# Patient Record
Sex: FEMALE | Race: WHITE | Employment: OTHER | URBAN - METROPOLITAN AREA
[De-identification: names, ages, dates, MRNs, and addresses within clinical notes are randomized per-mention and may not be internally consistent; named-entity substitution may affect disease eponyms.]

---

## 2017-05-10 ENCOUNTER — OFFICE VISIT (OUTPATIENT)
Dept: OBGYN CLINIC | Facility: CLINIC | Age: 57
End: 2017-05-10

## 2017-05-10 VITALS — WEIGHT: 186 LBS | BODY MASS INDEX: 29 KG/M2 | SYSTOLIC BLOOD PRESSURE: 118 MMHG | DIASTOLIC BLOOD PRESSURE: 64 MMHG

## 2017-05-10 DIAGNOSIS — I86.3 VARICOSE VEINS OF VULVA AND PERINEUM: Primary | ICD-10-CM

## 2017-05-10 PROCEDURE — 99203 OFFICE O/P NEW LOW 30 MIN: CPT | Performed by: OBSTETRICS & GYNECOLOGY

## 2017-05-10 RX ORDER — LEVOTHYROXINE SODIUM ANHYDROUS 100 UG/5ML
50 INJECTION, POWDER, LYOPHILIZED, FOR SOLUTION INTRAVENOUS DAILY
COMMUNITY
End: 2019-03-07

## 2017-05-10 RX ORDER — SIMVASTATIN 5 MG
5 TABLET ORAL NIGHTLY
COMMUNITY
End: 2019-07-24 | Stop reason: DRUGHIGH

## 2017-05-10 NOTE — PROGRESS NOTES
HPI:    Patient ID: Silvia Reese is a 61year old female. HPI  Patient reports skin tags on the vulva that she has had for 30 years. They bleed heavily when shaving. Discussed vulvar varicosities in detail and should be careful when shaving. This

## 2017-07-21 ENCOUNTER — LAB ENCOUNTER (OUTPATIENT)
Dept: LAB | Facility: HOSPITAL | Age: 57
End: 2017-07-21
Attending: INTERNAL MEDICINE
Payer: MEDICARE

## 2017-07-21 DIAGNOSIS — R73.09 ELEVATED GLUCOSE: ICD-10-CM

## 2017-07-21 DIAGNOSIS — E78.5 DYSLIPIDEMIA: ICD-10-CM

## 2017-07-21 DIAGNOSIS — E03.9 HYPOTHYROIDISM: ICD-10-CM

## 2017-07-21 DIAGNOSIS — E55.9 VITAMIN D DEFICIENCY: Primary | ICD-10-CM

## 2017-07-21 LAB
25(OH)D3 SERPL-MCNC: 35.4 NG/ML
ALBUMIN SERPL BCP-MCNC: 4.1 G/DL (ref 3.5–4.8)
ALBUMIN/GLOB SERPL: 1.2 {RATIO} (ref 1–2)
ALP SERPL-CCNC: 40 U/L (ref 32–100)
ALT SERPL-CCNC: 21 U/L (ref 14–54)
ANION GAP SERPL CALC-SCNC: 6 MMOL/L (ref 0–18)
AST SERPL-CCNC: 22 U/L (ref 15–41)
BASOPHILS # BLD: 0 K/UL (ref 0–0.2)
BASOPHILS NFR BLD: 1 %
BILIRUB SERPL-MCNC: 0.5 MG/DL (ref 0.3–1.2)
BILIRUB UR QL: NEGATIVE
BUN SERPL-MCNC: 11 MG/DL (ref 8–20)
BUN/CREAT SERPL: 13.4 (ref 10–20)
CALCIUM SERPL-MCNC: 9.9 MG/DL (ref 8.5–10.5)
CHLORIDE SERPL-SCNC: 103 MMOL/L (ref 95–110)
CHOLEST SERPL-MCNC: 180 MG/DL (ref 110–200)
CO2 SERPL-SCNC: 31 MMOL/L (ref 22–32)
COLOR UR: YELLOW
CREAT SERPL-MCNC: 0.82 MG/DL (ref 0.5–1.5)
EOSINOPHIL # BLD: 0 K/UL (ref 0–0.7)
EOSINOPHIL NFR BLD: 1 %
ERYTHROCYTE [DISTWIDTH] IN BLOOD BY AUTOMATED COUNT: 13 % (ref 11–15)
GLOBULIN PLAS-MCNC: 3.5 G/DL (ref 2.5–3.7)
GLUCOSE SERPL-MCNC: 106 MG/DL (ref 70–99)
GLUCOSE UR-MCNC: NEGATIVE MG/DL
HBA1C MFR BLD: 6.1 % (ref 4–6)
HCT VFR BLD AUTO: 40.7 % (ref 35–48)
HDLC SERPL-MCNC: 44 MG/DL
HGB BLD-MCNC: 13.8 G/DL (ref 12–16)
HGB UR QL STRIP.AUTO: NEGATIVE
KETONES UR-MCNC: NEGATIVE MG/DL
LDLC SERPL CALC-MCNC: 109 MG/DL (ref 0–99)
LEUKOCYTE ESTERASE UR QL STRIP.AUTO: NEGATIVE
LYMPHOCYTES # BLD: 1.3 K/UL (ref 1–4)
LYMPHOCYTES NFR BLD: 30 %
MCH RBC QN AUTO: 30.8 PG (ref 27–32)
MCHC RBC AUTO-ENTMCNC: 33.9 G/DL (ref 32–37)
MCV RBC AUTO: 90.8 FL (ref 80–100)
MONOCYTES # BLD: 0.4 K/UL (ref 0–1)
MONOCYTES NFR BLD: 9 %
NEUTROPHILS # BLD AUTO: 2.6 K/UL (ref 1.8–7.7)
NEUTROPHILS NFR BLD: 59 %
NITRITE UR QL STRIP.AUTO: NEGATIVE
NONHDLC SERPL-MCNC: 136 MG/DL
OSMOLALITY UR CALC.SUM OF ELEC: 290 MOSM/KG (ref 275–295)
PH UR: 6 [PH] (ref 5–8)
PLATELET # BLD AUTO: 202 K/UL (ref 140–400)
PMV BLD AUTO: 9.2 FL (ref 7.4–10.3)
POTASSIUM SERPL-SCNC: 4.7 MMOL/L (ref 3.3–5.1)
PROT SERPL-MCNC: 7.6 G/DL (ref 5.9–8.4)
PROT UR-MCNC: NEGATIVE MG/DL
RBC # BLD AUTO: 4.49 M/UL (ref 3.7–5.4)
SODIUM SERPL-SCNC: 140 MMOL/L (ref 136–144)
SP GR UR STRIP: 1.02 (ref 1–1.03)
TRIGL SERPL-MCNC: 135 MG/DL (ref 1–149)
TSH SERPL-ACNC: 3.8 UIU/ML (ref 0.45–5.33)
UROBILINOGEN UR STRIP-ACNC: <2
VIT C UR-MCNC: NEGATIVE MG/DL
WBC # BLD AUTO: 4.4 K/UL (ref 4–11)

## 2017-07-21 PROCEDURE — 81003 URINALYSIS AUTO W/O SCOPE: CPT

## 2017-07-21 PROCEDURE — 36415 COLL VENOUS BLD VENIPUNCTURE: CPT

## 2017-07-21 PROCEDURE — 84443 ASSAY THYROID STIM HORMONE: CPT

## 2017-07-21 PROCEDURE — 80053 COMPREHEN METABOLIC PANEL: CPT

## 2017-07-21 PROCEDURE — 83036 HEMOGLOBIN GLYCOSYLATED A1C: CPT

## 2017-07-21 PROCEDURE — 82306 VITAMIN D 25 HYDROXY: CPT

## 2017-07-21 PROCEDURE — 80061 LIPID PANEL: CPT

## 2017-07-21 PROCEDURE — 85025 COMPLETE CBC W/AUTO DIFF WBC: CPT

## 2017-07-28 ENCOUNTER — OFFICE VISIT (OUTPATIENT)
Dept: GASTROENTEROLOGY | Facility: CLINIC | Age: 57
End: 2017-07-28

## 2017-07-28 VITALS
SYSTOLIC BLOOD PRESSURE: 99 MMHG | WEIGHT: 178 LBS | BODY MASS INDEX: 27.94 KG/M2 | HEART RATE: 80 BPM | HEIGHT: 67 IN | DIASTOLIC BLOOD PRESSURE: 68 MMHG

## 2017-07-28 DIAGNOSIS — D49.0 IPMN (INTRADUCTAL PAPILLARY MUCINOUS NEOPLASM): ICD-10-CM

## 2017-07-28 DIAGNOSIS — Z12.11 SCREEN FOR COLON CANCER: Primary | ICD-10-CM

## 2017-07-28 DIAGNOSIS — K86.9 PANCREATIC LESION: ICD-10-CM

## 2017-07-28 PROCEDURE — G0463 HOSPITAL OUTPT CLINIC VISIT: HCPCS | Performed by: INTERNAL MEDICINE

## 2017-07-28 PROCEDURE — 99204 OFFICE O/P NEW MOD 45 MIN: CPT | Performed by: INTERNAL MEDICINE

## 2017-07-28 RX ORDER — OMEPRAZOLE 40 MG/1
40 CAPSULE, DELAYED RELEASE ORAL DAILY
Qty: 30 CAPSULE | Refills: 3 | Status: SHIPPED | OUTPATIENT
Start: 2017-07-28 | End: 2017-08-27

## 2017-07-28 RX ORDER — IBUPROFEN 600 MG/1
600 TABLET ORAL EVERY 6 HOURS PRN
COMMUNITY

## 2017-07-28 NOTE — H&P
[address] [facility] [address] Gastroenterology                                                                                                  Clinic History and Physical     Pa of prior     pancreatitis, tiny pseudocysts. For further evaluation, and assessment     of possible ductal communication, MRCP is recommended.   2. Suggestion of a subcentimeter lesion in the upper pole of the left     kidney which may represent an angiomyo dysuria or gross hematuria  INTEGUMENT/BREAST:  SKIN:  negative for jaundice   ALLERGIC/IMMUNOLOGIC:  negative for hay fever  ENDOCRINE:  negative for cold intolerance and heat intolerance  MUSCULOSKELETAL:  negative for joint effusion/severe erythema  BEH doses of NSAIDs. No anemia on labs. LFTs wnl. #Sidebranch IPMN - PD normal size (2015)  #Screening-AVG - due in 2020   #Epigastric pain  #NSAID use    Recommend:  1. MRI test of pancreas  2.  Start omeprazole 40mg a day  3. Reduce intake of ibuprofen

## 2017-08-02 ENCOUNTER — HOSPITAL ENCOUNTER (OUTPATIENT)
Dept: MRI IMAGING | Facility: HOSPITAL | Age: 57
Discharge: HOME | End: 2017-08-02
Attending: INTERNAL MEDICINE
Payer: MEDICARE

## 2017-08-02 ENCOUNTER — TELEPHONE (OUTPATIENT)
Dept: GASTROENTEROLOGY | Facility: CLINIC | Age: 57
End: 2017-08-02

## 2017-08-02 DIAGNOSIS — D49.0 IPMN (INTRADUCTAL PAPILLARY MUCINOUS NEOPLASM): ICD-10-CM

## 2017-08-02 DIAGNOSIS — K86.9 PANCREATIC LESION: ICD-10-CM

## 2017-08-02 LAB — CREAT BLD-MCNC: 0.7 MG/DL (ref 0.5–1.5)

## 2017-08-02 PROCEDURE — 82565 ASSAY OF CREATININE: CPT

## 2017-08-02 PROCEDURE — 74183 MRI ABD W/O CNTR FLWD CNTR: CPT | Performed by: INTERNAL MEDICINE

## 2017-08-02 PROCEDURE — A9575 INJ GADOTERATE MEGLUMI 0.1ML: HCPCS | Performed by: INTERNAL MEDICINE

## 2017-08-02 NOTE — TELEPHONE ENCOUNTER
Called pt/daughter re: MRI scan of pancreas and left detailed msg. The cystic IPMN lesion is stable, no change in size since 2015. Will plan to repeat MRCP in 1 year. If she is still having epigastric pain, she will require EGD.  I asked her to call back

## 2017-11-29 ENCOUNTER — OFFICE VISIT (OUTPATIENT)
Dept: OBGYN CLINIC | Facility: CLINIC | Age: 57
End: 2017-11-29

## 2017-11-29 VITALS
WEIGHT: 185.38 LBS | SYSTOLIC BLOOD PRESSURE: 107 MMHG | HEART RATE: 70 BPM | BODY MASS INDEX: 29 KG/M2 | DIASTOLIC BLOOD PRESSURE: 73 MMHG

## 2017-11-29 DIAGNOSIS — R39.15 URGENCY OF URINATION: ICD-10-CM

## 2017-11-29 DIAGNOSIS — Z01.419 ENCOUNTER FOR GYNECOLOGICAL EXAMINATION WITHOUT ABNORMAL FINDING: ICD-10-CM

## 2017-11-29 DIAGNOSIS — Z12.31 ENCOUNTER FOR SCREENING MAMMOGRAM FOR BREAST CANCER: ICD-10-CM

## 2017-11-29 DIAGNOSIS — Z01.419 ENCOUNTER FOR WELL WOMAN EXAM WITH ROUTINE GYNECOLOGICAL EXAM: Primary | ICD-10-CM

## 2017-11-29 PROCEDURE — 99396 PREV VISIT EST AGE 40-64: CPT | Performed by: OBSTETRICS & GYNECOLOGY

## 2017-11-29 RX ORDER — LEVOTHYROXINE SODIUM 0.07 MG/1
75 TABLET ORAL
COMMUNITY

## 2017-11-29 RX ORDER — METOCLOPRAMIDE HYDROCHLORIDE 5 MG/5ML
5 SOLUTION ORAL AS NEEDED
COMMUNITY
Start: 2015-02-11

## 2017-11-30 NOTE — PROGRESS NOTES
HPI:    Patient ID: Laurence Hitchcock is a 61year old female. HPI  Patient here for routine exam.  Reports urinary frequency but not a lot of urine comes out. Will check U/A but patient admits to not drinking water very much. Needs mammogram order. Skin: Skin is warm and dry. Psychiatric: She has a normal mood and affect. Her behavior is normal. Judgment and thought content normal.   Nursing note and vitals reviewed. Pap Done.            ASSESSMENT/PLAN:   Encounter for well woman exam with joana

## 2017-12-27 ENCOUNTER — OFFICE VISIT (OUTPATIENT)
Dept: DERMATOLOGY CLINIC | Facility: CLINIC | Age: 57
End: 2017-12-27

## 2017-12-27 DIAGNOSIS — L30.9 DERMATITIS: ICD-10-CM

## 2017-12-27 DIAGNOSIS — L82.1 SEBORRHEIC KERATOSES: ICD-10-CM

## 2017-12-27 DIAGNOSIS — D23.60 BENIGN NEOPLASM OF SKIN OF UPPER LIMB, INCLUDING SHOULDER, UNSPECIFIED LATERALITY: ICD-10-CM

## 2017-12-27 DIAGNOSIS — D23.4 BENIGN NEOPLASM OF SCALP AND SKIN OF NECK: ICD-10-CM

## 2017-12-27 DIAGNOSIS — D23.70 BENIGN NEOPLASM OF SKIN OF LOWER LIMB, INCLUDING HIP, UNSPECIFIED LATERALITY: ICD-10-CM

## 2017-12-27 DIAGNOSIS — D23.5 BENIGN NEOPLASM OF SKIN OF TRUNK, EXCEPT SCROTUM: Primary | ICD-10-CM

## 2017-12-27 PROCEDURE — 99202 OFFICE O/P NEW SF 15 MIN: CPT | Performed by: DERMATOLOGY

## 2017-12-27 PROCEDURE — G0463 HOSPITAL OUTPT CLINIC VISIT: HCPCS | Performed by: DERMATOLOGY

## 2018-01-08 NOTE — PROGRESS NOTES
Clifton Lane is a 61year old female. HPI:     CC:  Patient presents with:  Full Skin Exam: New pt presenting for full body skin exam. Pt denies personal and family hx of skin cancer. Dryness: Pt concerned with dryness to hands and face.   Derm Probl date: CHOLECYSTECTOMY  2015: COLONOSCOPY  2015: EGD  11/19/2012: ELECTROCARDIOGRAM, COMPLETE      Comment: scanned to media tab    Social History  Social History   Marital status:    Spouse name: N/A    Years of education: N/A  Number of children: N/ examined with dermoscopy benign-appearing patterns. Waxy tannish keratotic papules scattered, cherry-red vascular papules scattered. See map today's date for lesions noted . Otherwise remarkable for lesions as noted on map.   See details of exam various therapies, risks benefits discussed. Pathophysiology discussed with patient. Therapeutic options reviewed. See  Medications in grid. Instructions reviewed at length.     Benign nevi, seborrheic  keratoses, cherry angiomas:  Reassurance regarding o

## 2018-01-17 ENCOUNTER — HOSPITAL ENCOUNTER (OUTPATIENT)
Dept: MAMMOGRAPHY | Age: 58
Discharge: HOME | End: 2018-01-17
Attending: OBSTETRICS & GYNECOLOGY
Payer: MEDICARE

## 2018-01-17 DIAGNOSIS — Z12.31 ENCOUNTER FOR SCREENING MAMMOGRAM FOR BREAST CANCER: ICD-10-CM

## 2018-01-17 PROCEDURE — 77067 SCR MAMMO BI INCL CAD: CPT | Performed by: OBSTETRICS & GYNECOLOGY

## 2019-03-07 ENCOUNTER — APPOINTMENT (OUTPATIENT)
Dept: CT IMAGING | Age: 59
End: 2019-03-07
Attending: EMERGENCY MEDICINE
Payer: MEDICARE

## 2019-03-07 ENCOUNTER — APPOINTMENT (OUTPATIENT)
Dept: MRI IMAGING | Age: 59
End: 2019-03-07
Attending: EMERGENCY MEDICINE
Payer: MEDICARE

## 2019-03-07 ENCOUNTER — HOSPITAL ENCOUNTER (EMERGENCY)
Age: 59
Discharge: HOME | End: 2019-03-07
Attending: EMERGENCY MEDICINE
Payer: MEDICARE

## 2019-03-07 ENCOUNTER — APPOINTMENT (OUTPATIENT)
Dept: GENERAL RADIOLOGY | Age: 59
End: 2019-03-07
Attending: EMERGENCY MEDICINE
Payer: MEDICARE

## 2019-03-07 VITALS
BODY MASS INDEX: 28.25 KG/M2 | HEART RATE: 72 BPM | RESPIRATION RATE: 16 BRPM | TEMPERATURE: 99 F | SYSTOLIC BLOOD PRESSURE: 111 MMHG | OXYGEN SATURATION: 95 % | WEIGHT: 180 LBS | HEIGHT: 67 IN | DIASTOLIC BLOOD PRESSURE: 76 MMHG

## 2019-03-07 DIAGNOSIS — R20.2 NUMBNESS AND TINGLING OF BOTH LEGS: Primary | ICD-10-CM

## 2019-03-07 DIAGNOSIS — R20.0 NUMBNESS AND TINGLING OF BOTH LEGS: Primary | ICD-10-CM

## 2019-03-07 LAB
ALBUMIN SERPL-MCNC: 3.6 G/DL (ref 3.4–5)
ALBUMIN/GLOB SERPL: 1 {RATIO} (ref 1–2)
ALP LIVER SERPL-CCNC: 46 U/L (ref 46–118)
ALT SERPL-CCNC: 23 U/L (ref 13–56)
ANION GAP SERPL CALC-SCNC: 7 MMOL/L (ref 0–18)
AST SERPL-CCNC: 10 U/L (ref 15–37)
BASOPHILS # BLD AUTO: 0.04 X10(3) UL (ref 0–0.2)
BASOPHILS NFR BLD AUTO: 0.7 %
BILIRUB SERPL-MCNC: 0.3 MG/DL (ref 0.1–2)
BILIRUB UR QL STRIP.AUTO: NEGATIVE
BUN BLD-MCNC: 12 MG/DL (ref 7–18)
BUN/CREAT SERPL: 16.4 (ref 10–20)
CALCIUM BLD-MCNC: 8.3 MG/DL (ref 8.5–10.1)
CHLORIDE SERPL-SCNC: 107 MMOL/L (ref 98–107)
CK SERPL-CCNC: 70 U/L (ref 26–192)
CLARITY UR REFRACT.AUTO: CLEAR
CO2 SERPL-SCNC: 27 MMOL/L (ref 21–32)
COLOR UR AUTO: YELLOW
CREAT BLD-MCNC: 0.73 MG/DL (ref 0.55–1.02)
DEPRECATED RDW RBC AUTO: 39.8 FL (ref 35.1–46.3)
EOSINOPHIL # BLD AUTO: 0.05 X10(3) UL (ref 0–0.7)
EOSINOPHIL NFR BLD AUTO: 0.8 %
ERYTHROCYTE [DISTWIDTH] IN BLOOD BY AUTOMATED COUNT: 12 % (ref 11–15)
GLOBULIN PLAS-MCNC: 3.7 G/DL (ref 2.8–4.4)
GLUCOSE BLD-MCNC: 157 MG/DL (ref 70–99)
GLUCOSE UR STRIP.AUTO-MCNC: NEGATIVE MG/DL
HCT VFR BLD AUTO: 36.7 % (ref 35–48)
HGB BLD-MCNC: 12.1 G/DL (ref 12–16)
IMM GRANULOCYTES # BLD AUTO: 0.01 X10(3) UL (ref 0–1)
IMM GRANULOCYTES NFR BLD: 0.2 %
KETONES UR STRIP.AUTO-MCNC: NEGATIVE MG/DL
LEUKOCYTE ESTERASE UR QL STRIP.AUTO: NEGATIVE
LYMPHOCYTES # BLD AUTO: 1.71 X10(3) UL (ref 1–4)
LYMPHOCYTES NFR BLD AUTO: 29 %
M PROTEIN MFR SERPL ELPH: 7.3 G/DL (ref 6.4–8.2)
MCH RBC QN AUTO: 29.9 PG (ref 26–34)
MCHC RBC AUTO-ENTMCNC: 33 G/DL (ref 31–37)
MCV RBC AUTO: 90.6 FL (ref 80–100)
MONOCYTES # BLD AUTO: 0.5 X10(3) UL (ref 0.1–1)
MONOCYTES NFR BLD AUTO: 8.5 %
NEUTROPHILS # BLD AUTO: 3.59 X10 (3) UL (ref 1.5–7.7)
NEUTROPHILS # BLD AUTO: 3.59 X10(3) UL (ref 1.5–7.7)
NEUTROPHILS NFR BLD AUTO: 60.8 %
NITRITE UR QL STRIP.AUTO: NEGATIVE
OSMOLALITY SERPL CALC.SUM OF ELEC: 295 MOSM/KG (ref 275–295)
PH UR STRIP.AUTO: 5.5 [PH] (ref 4.5–8)
PLATELET # BLD AUTO: 213 10(3)UL (ref 150–450)
POTASSIUM SERPL-SCNC: 3.8 MMOL/L (ref 3.5–5.1)
PROT UR STRIP.AUTO-MCNC: NEGATIVE MG/DL
RBC # BLD AUTO: 4.05 X10(6)UL (ref 3.8–5.3)
SODIUM SERPL-SCNC: 141 MMOL/L (ref 136–145)
SP GR UR STRIP.AUTO: 1.02 (ref 1–1.03)
UROBILINOGEN UR STRIP.AUTO-MCNC: 0.2 MG/DL
WBC # BLD AUTO: 5.9 X10(3) UL (ref 4–11)

## 2019-03-07 PROCEDURE — 81003 URINALYSIS AUTO W/O SCOPE: CPT | Performed by: EMERGENCY MEDICINE

## 2019-03-07 PROCEDURE — 70553 MRI BRAIN STEM W/O & W/DYE: CPT | Performed by: EMERGENCY MEDICINE

## 2019-03-07 PROCEDURE — 80053 COMPREHEN METABOLIC PANEL: CPT | Performed by: EMERGENCY MEDICINE

## 2019-03-07 PROCEDURE — 36415 COLL VENOUS BLD VENIPUNCTURE: CPT

## 2019-03-07 PROCEDURE — 85025 COMPLETE CBC W/AUTO DIFF WBC: CPT | Performed by: EMERGENCY MEDICINE

## 2019-03-07 PROCEDURE — 99284 EMERGENCY DEPT VISIT MOD MDM: CPT

## 2019-03-07 PROCEDURE — 99285 EMERGENCY DEPT VISIT HI MDM: CPT

## 2019-03-07 PROCEDURE — 71046 X-RAY EXAM CHEST 2 VIEWS: CPT | Performed by: EMERGENCY MEDICINE

## 2019-03-07 PROCEDURE — A9575 INJ GADOTERATE MEGLUMI 0.1ML: HCPCS | Performed by: RADIOLOGY

## 2019-03-07 PROCEDURE — 70450 CT HEAD/BRAIN W/O DYE: CPT | Performed by: EMERGENCY MEDICINE

## 2019-03-07 PROCEDURE — 82550 ASSAY OF CK (CPK): CPT | Performed by: EMERGENCY MEDICINE

## 2019-03-07 NOTE — ED PROVIDER NOTES
Patient Seen in: [facility] [place]    History   Patient presents with:  Numbness Weakness (neurologic)  Headache (neurologic)    Stated Complaint: numbness/tingling to both legs since yesterday, headache.     HPI    50-year-old fem SpO2 94%   BMI 28.19 kg/m²         Physical Exam    General: Alert, oriented, no apparent distress  HEENT: Atraumatic, normocephalic. Pupils equal reactive. Extraocular motions intact. Oropharynx clear.     Neck: Supple  Lungs: Clear to auscultation raúl has been having numbness and tingling down both of her legs since yesterday. She also has a headache that started today. FINDINGS:  LUNGS:  No focal consolidation. Normal vascularity. CARDIAC:  Normal size cardiac silhouette.  MEDIASTINUM:  Normal. PLEU ruptured dermoid is considered, and MRI of the brain is recommended for further evaluation.     Dictated by: Nasim Samuels MD on 3/07/2019 at 14:12     Approved by: Nasim Samuels MD            Mri Brain (w+wo) (KUV=[id number])    Result Date: 3/7/2019  PROCEDURE dermoid. Dictated by: Jen Hernandez MD on 3/07/2019 at 15:32     Approved by: Jen Hernandez MD                MDM         Patient is completely neurovascularly intact to the lower extremities.   She describes a pins and needle sensation mostly t

## 2019-03-07 NOTE — ED INITIAL ASSESSMENT (HPI)
Numbness and tingling to both legs from hips down. States this started yesterday. Denies back pain, denies any trauma.

## 2019-07-03 ENCOUNTER — OFFICE VISIT (OUTPATIENT)
Dept: RHEUMATOLOGY | Facility: CLINIC | Age: 59
End: 2019-07-03
Payer: MEDICARE

## 2019-07-03 ENCOUNTER — HOSPITAL ENCOUNTER (OUTPATIENT)
Dept: GENERAL RADIOLOGY | Age: 59
Discharge: HOME | End: 2019-07-03
Attending: INTERNAL MEDICINE
Payer: MEDICARE

## 2019-07-03 ENCOUNTER — APPOINTMENT (OUTPATIENT)
Dept: LAB | Age: 59
End: 2019-07-03
Attending: INTERNAL MEDICINE
Payer: MEDICARE

## 2019-07-03 ENCOUNTER — HOSPITAL ENCOUNTER (OUTPATIENT)
Dept: GENERAL RADIOLOGY | Age: 59
Discharge: HOME | End: 2019-07-03
Attending: INTERNAL MEDICINE | Admitting: INTERNAL MEDICINE
Payer: MEDICARE

## 2019-07-03 VITALS
SYSTOLIC BLOOD PRESSURE: 117 MMHG | HEART RATE: 79 BPM | BODY MASS INDEX: 27 KG/M2 | DIASTOLIC BLOOD PRESSURE: 79 MMHG | WEIGHT: 175 LBS

## 2019-07-03 DIAGNOSIS — M13.0 POLYARTHRITIS: Primary | ICD-10-CM

## 2019-07-03 DIAGNOSIS — M13.0 POLYARTHRITIS: ICD-10-CM

## 2019-07-03 DIAGNOSIS — R76.8 POSITIVE ANA (ANTINUCLEAR ANTIBODY): ICD-10-CM

## 2019-07-03 LAB
C3 SERPL-MCNC: 121 MG/DL (ref 90–180)
C4 SERPL-MCNC: 24.1 MG/DL (ref 10–40)
RHEUMATOID FACT SERPL-ACNC: <10 IU/ML (ref ?–15)

## 2019-07-03 PROCEDURE — 86235 NUCLEAR ANTIGEN ANTIBODY: CPT

## 2019-07-03 PROCEDURE — 72110 X-RAY EXAM L-2 SPINE 4/>VWS: CPT | Performed by: INTERNAL MEDICINE

## 2019-07-03 PROCEDURE — 86200 CCP ANTIBODY: CPT

## 2019-07-03 PROCEDURE — 86225 DNA ANTIBODY NATIVE: CPT

## 2019-07-03 PROCEDURE — 73560 X-RAY EXAM OF KNEE 1 OR 2: CPT | Performed by: INTERNAL MEDICINE

## 2019-07-03 PROCEDURE — G0463 HOSPITAL OUTPT CLINIC VISIT: HCPCS | Performed by: INTERNAL MEDICINE

## 2019-07-03 PROCEDURE — 86431 RHEUMATOID FACTOR QUANT: CPT

## 2019-07-03 PROCEDURE — 99204 OFFICE O/P NEW MOD 45 MIN: CPT | Performed by: INTERNAL MEDICINE

## 2019-07-03 PROCEDURE — 36415 COLL VENOUS BLD VENIPUNCTURE: CPT

## 2019-07-03 PROCEDURE — 84165 PROTEIN E-PHORESIS SERUM: CPT

## 2019-07-03 PROCEDURE — 86160 COMPLEMENT ANTIGEN: CPT

## 2019-07-03 NOTE — PROGRESS NOTES
Dear Dr. Danette Beltran:    I saw your patient Mariah Nice in consultation this afternoon at your request for evaluation of positive NEW and arthritis.   As you know, she is a 56-year-old woman from [facility], who complains primarily of discomfort in her knee systems:  No fevers, chills, sweats, anorexia. She is lost 10 pounds in the last month may be because she is been walking more. No rash, alopecia, eye inflammation, oral or nasal ulcers, lymphadenopathy. No shortness of breath or chest pain.   Occasional weeks.    3.  Hypothyroidism. 4.  Severely low vitamin D. She is on high-dose replacement. Thank you for inviting me to participate in her care. Sincerely,      Dmitriy Martinez MD   Rheumatology.

## 2019-07-05 ENCOUNTER — TELEPHONE (OUTPATIENT)
Dept: INTERNAL MEDICINE CLINIC | Facility: CLINIC | Age: 59
End: 2019-07-05

## 2019-07-05 LAB
CCP IGG SERPL-ACNC: 1 U/ML (ref 0–6.9)
DSDNA AB TITR SER: <10 {TITER}

## 2019-07-05 NOTE — TELEPHONE ENCOUNTER
Pt's daughter is calling to schedule 3 week f/u at [facility] but needs an appt at 4 or 410. She states she spoke to DR about this at last OV. Ok to add pt in at that time? Please advise. Thank you.      Please reply to kelly: MICHELLE Javier [facility]

## 2019-07-06 LAB
ALBUMIN SERPL ELPH-MCNC: 4.34 G/DL (ref 3.75–5.21)
ALBUMIN/GLOB SERPL: 1.29 {RATIO} (ref 1–2)
ALPHA1 GLOB SERPL ELPH-MCNC: 0.26 G/DL (ref 0.19–0.46)
ALPHA2 GLOB SERPL ELPH-MCNC: 0.57 G/DL (ref 0.48–1.05)
B-GLOBULIN SERPL ELPH-MCNC: 0.82 G/DL (ref 0.68–1.23)
GAMMA GLOB SERPL ELPH-MCNC: 1.7 G/DL (ref 0.62–1.7)
TOTAL PROTEIN (SPECIAL TESTING): 7.7 G/DL (ref 6.5–9.1)

## 2019-07-09 LAB
ENA SM IGG SER QL: NEGATIVE
ENA SM+RNP AB SER QL: NEGATIVE
ENA SS-A AB SER QL IA: NEGATIVE
ENA SS-B AB SER QL IA: NEGATIVE

## 2019-07-24 ENCOUNTER — OFFICE VISIT (OUTPATIENT)
Dept: RHEUMATOLOGY | Facility: CLINIC | Age: 59
End: 2019-07-24

## 2019-07-24 VITALS
DIASTOLIC BLOOD PRESSURE: 62 MMHG | HEIGHT: 67 IN | SYSTOLIC BLOOD PRESSURE: 96 MMHG | HEART RATE: 80 BPM | WEIGHT: 176.81 LBS | BODY MASS INDEX: 27.75 KG/M2

## 2019-07-24 DIAGNOSIS — M15.9 PRIMARY OSTEOARTHRITIS INVOLVING MULTIPLE JOINTS: Primary | ICD-10-CM

## 2019-07-24 PROBLEM — M15.0 PRIMARY OSTEOARTHRITIS INVOLVING MULTIPLE JOINTS: Status: ACTIVE | Noted: 2019-07-24

## 2019-07-24 PROCEDURE — 99213 OFFICE O/P EST LOW 20 MIN: CPT | Performed by: INTERNAL MEDICINE

## 2019-07-24 PROCEDURE — G0463 HOSPITAL OUTPT CLINIC VISIT: HCPCS | Performed by: INTERNAL MEDICINE

## 2019-07-24 RX ORDER — SIMVASTATIN 20 MG
20 TABLET ORAL NIGHTLY
Refills: 1 | COMMUNITY
Start: 2019-05-11

## 2019-07-24 RX ORDER — TRAZODONE HYDROCHLORIDE 50 MG/1
50 TABLET ORAL AS NEEDED
Refills: 1 | COMMUNITY
Start: 2019-05-11

## 2019-07-24 RX ORDER — IBUPROFEN 200 MG
200 TABLET ORAL AS NEEDED
COMMUNITY

## 2019-07-24 RX ORDER — NAPROXEN 500 MG/1
500 TABLET ORAL AS NEEDED
Refills: 1 | COMMUNITY
Start: 2019-05-11

## 2019-07-24 RX ORDER — CLONAZEPAM 0.5 MG/1
0.5 TABLET ORAL AS NEEDED
Refills: 0 | COMMUNITY
Start: 2019-02-07

## 2019-07-24 NOTE — PROGRESS NOTES
Dear Dr. Ge Mcknight:     I saw your patient Clifton Lane in follow-up this afternoon in my rheumatology clinic. She continues to have pain in her joints, like before. We reviewed her lab results from July 3rd of 2019.   SPEP was normal.  Specific an

## 2020-05-01 ENCOUNTER — TELEPHONE (OUTPATIENT)
Dept: RHEUMATOLOGY | Facility: CLINIC | Age: 60
End: 2020-05-01

## 2020-05-08 ENCOUNTER — TELEPHONE (OUTPATIENT)
Dept: RHEUMATOLOGY | Facility: CLINIC | Age: 60
End: 2020-05-08

## 2021-12-08 ENCOUNTER — LAB ENCOUNTER (OUTPATIENT)
Dept: LAB | Age: 61
End: 2021-12-08
Attending: INTERNAL MEDICINE
Payer: MEDICARE

## 2021-12-08 DIAGNOSIS — E55.9 VITAMIN D DEFICIENCY: ICD-10-CM

## 2021-12-08 DIAGNOSIS — E16.2 HYPOGLYCEMIA: ICD-10-CM

## 2021-12-08 DIAGNOSIS — R42 DIZZINESS: Primary | ICD-10-CM

## 2021-12-08 PROCEDURE — 36415 COLL VENOUS BLD VENIPUNCTURE: CPT

## 2021-12-08 PROCEDURE — 80053 COMPREHEN METABOLIC PANEL: CPT

## 2021-12-08 PROCEDURE — 83036 HEMOGLOBIN GLYCOSYLATED A1C: CPT

## 2021-12-08 PROCEDURE — 82306 VITAMIN D 25 HYDROXY: CPT

## 2021-12-08 PROCEDURE — 85025 COMPLETE CBC W/AUTO DIFF WBC: CPT

## 2021-12-08 PROCEDURE — 84443 ASSAY THYROID STIM HORMONE: CPT

## 2021-12-08 PROCEDURE — 81001 URINALYSIS AUTO W/SCOPE: CPT

## 2021-12-08 PROCEDURE — 80061 LIPID PANEL: CPT

## 2022-02-09 PROBLEM — M17.12 PRIMARY OSTEOARTHRITIS OF LEFT KNEE: Status: ACTIVE | Noted: 2022-02-09

## 2022-03-10 ENCOUNTER — HOSPITAL ENCOUNTER (OUTPATIENT)
Dept: ULTRASOUND IMAGING | Age: 62
Discharge: HOME | End: 2022-03-10
Attending: OTOLARYNGOLOGY
Payer: MEDICARE

## 2022-03-10 DIAGNOSIS — E05.90 HYPERTHYROIDISM: ICD-10-CM

## 2022-03-10 PROCEDURE — 76536 US EXAM OF HEAD AND NECK: CPT | Performed by: OTOLARYNGOLOGY

## 2022-05-04 PROBLEM — K86.2 CYSTIC MASS OF PANCREAS (HCC): Status: ACTIVE | Noted: 2017-07-28

## 2022-05-04 PROBLEM — K86.2 CYSTIC MASS OF PANCREAS: Status: ACTIVE | Noted: 2017-07-28

## 2022-05-05 ENCOUNTER — OFFICE VISIT (OUTPATIENT)
Dept: OBGYN CLINIC | Facility: CLINIC | Age: 62
End: 2022-05-05
Payer: MEDICARE

## 2022-05-05 VITALS
HEART RATE: 90 BPM | WEIGHT: 167.38 LBS | SYSTOLIC BLOOD PRESSURE: 122 MMHG | DIASTOLIC BLOOD PRESSURE: 74 MMHG | BODY MASS INDEX: 26.27 KG/M2 | HEIGHT: 67 IN

## 2022-05-05 DIAGNOSIS — Z01.419 ENCOUNTER FOR GYNECOLOGICAL EXAMINATION WITHOUT ABNORMAL FINDING: Primary | ICD-10-CM

## 2022-05-05 DIAGNOSIS — Z12.4 SCREENING FOR CERVICAL CANCER: ICD-10-CM

## 2022-05-05 PROCEDURE — 87624 HPV HI-RISK TYP POOLED RSLT: CPT | Performed by: OBSTETRICS & GYNECOLOGY

## 2022-05-05 PROCEDURE — 88175 CYTOPATH C/V AUTO FLUID REDO: CPT | Performed by: OBSTETRICS & GYNECOLOGY

## 2022-05-05 PROCEDURE — G0101 CA SCREEN;PELVIC/BREAST EXAM: HCPCS | Performed by: OBSTETRICS & GYNECOLOGY

## 2022-05-05 RX ORDER — CEPHALEXIN 500 MG/1
500 CAPSULE ORAL 3 TIMES DAILY
COMMUNITY
Start: 2021-12-13

## 2022-05-05 RX ORDER — OMEPRAZOLE 20 MG/1
20 CAPSULE, DELAYED RELEASE ORAL DAILY
COMMUNITY
Start: 2022-04-25

## 2022-05-06 LAB — HPV I/H RISK 1 DNA SPEC QL NAA+PROBE: NEGATIVE

## 2022-11-09 ENCOUNTER — LAB ENCOUNTER (OUTPATIENT)
Dept: LAB | Age: 62
End: 2022-11-09
Attending: INTERNAL MEDICINE
Payer: MEDICARE

## 2022-11-09 DIAGNOSIS — E03.9 HYPOTHYROIDISM: ICD-10-CM

## 2022-11-09 DIAGNOSIS — E78.5 DYSLIPIDEMIA: Primary | ICD-10-CM

## 2022-11-09 DIAGNOSIS — E55.9 VITAMIN D DEFICIENCY: ICD-10-CM

## 2022-11-09 DIAGNOSIS — N39.0 UTI (URINARY TRACT INFECTION): ICD-10-CM

## 2022-11-09 DIAGNOSIS — E66.9 OBESITY: ICD-10-CM

## 2022-11-09 LAB
ALBUMIN SERPL-MCNC: 4 G/DL (ref 3.4–5)
ALBUMIN/GLOB SERPL: 1.1 {RATIO} (ref 1–2)
ALP LIVER SERPL-CCNC: 48 U/L
ALT SERPL-CCNC: 29 U/L
ANION GAP SERPL CALC-SCNC: 4 MMOL/L (ref 0–18)
AST SERPL-CCNC: 18 U/L (ref 15–37)
BASOPHILS # BLD AUTO: 0.05 X10(3) UL (ref 0–0.2)
BASOPHILS NFR BLD AUTO: 0.8 %
BILIRUB SERPL-MCNC: 0.5 MG/DL (ref 0.1–2)
BILIRUB UR QL STRIP.AUTO: NEGATIVE
BUN BLD-MCNC: 13 MG/DL (ref 7–18)
CALCIUM BLD-MCNC: 9.3 MG/DL (ref 8.5–10.1)
CHLORIDE SERPL-SCNC: 107 MMOL/L (ref 98–112)
CHOLEST SERPL-MCNC: 182 MG/DL (ref ?–200)
CLARITY UR REFRACT.AUTO: CLEAR
CO2 SERPL-SCNC: 30 MMOL/L (ref 21–32)
COLOR UR AUTO: YELLOW
CREAT BLD-MCNC: 0.86 MG/DL
EOSINOPHIL # BLD AUTO: 0.05 X10(3) UL (ref 0–0.7)
EOSINOPHIL NFR BLD AUTO: 0.8 %
ERYTHROCYTE [DISTWIDTH] IN BLOOD BY AUTOMATED COUNT: 12.4 %
FASTING PATIENT LIPID ANSWER: YES
FASTING STATUS PATIENT QL REPORTED: YES
GFR SERPLBLD BASED ON 1.73 SQ M-ARVRAT: 76 ML/MIN/1.73M2 (ref 60–?)
GLOBULIN PLAS-MCNC: 3.6 G/DL (ref 2.8–4.4)
GLUCOSE BLD-MCNC: 99 MG/DL (ref 70–99)
GLUCOSE UR STRIP.AUTO-MCNC: NEGATIVE MG/DL
HCT VFR BLD AUTO: 40.1 %
HDLC SERPL-MCNC: 50 MG/DL (ref 40–59)
HGB BLD-MCNC: 13.3 G/DL
HYALINE CASTS #/AREA URNS AUTO: PRESENT /LPF
IMM GRANULOCYTES # BLD AUTO: 0.02 X10(3) UL (ref 0–1)
IMM GRANULOCYTES NFR BLD: 0.3 %
KETONES UR STRIP.AUTO-MCNC: NEGATIVE MG/DL
LDLC SERPL CALC-MCNC: 116 MG/DL (ref ?–100)
LEUKOCYTE ESTERASE UR QL STRIP.AUTO: NEGATIVE
LYMPHOCYTES # BLD AUTO: 1.95 X10(3) UL (ref 1–4)
LYMPHOCYTES NFR BLD AUTO: 31.9 %
MCH RBC QN AUTO: 31.3 PG (ref 26–34)
MCHC RBC AUTO-ENTMCNC: 33.2 G/DL (ref 31–37)
MCV RBC AUTO: 94.4 FL
MONOCYTES # BLD AUTO: 0.59 X10(3) UL (ref 0.1–1)
MONOCYTES NFR BLD AUTO: 9.6 %
NEUTROPHILS # BLD AUTO: 3.46 X10 (3) UL (ref 1.5–7.7)
NEUTROPHILS # BLD AUTO: 3.46 X10(3) UL (ref 1.5–7.7)
NEUTROPHILS NFR BLD AUTO: 56.6 %
NITRITE UR QL STRIP.AUTO: NEGATIVE
NONHDLC SERPL-MCNC: 132 MG/DL (ref ?–130)
OSMOLALITY SERPL CALC.SUM OF ELEC: 292 MOSM/KG (ref 275–295)
PH UR STRIP.AUTO: 6 [PH] (ref 5–8)
PLATELET # BLD AUTO: 263 10(3)UL (ref 150–450)
POTASSIUM SERPL-SCNC: 4.4 MMOL/L (ref 3.5–5.1)
PROT SERPL-MCNC: 7.6 G/DL (ref 6.4–8.2)
PROT UR STRIP.AUTO-MCNC: NEGATIVE MG/DL
RBC # BLD AUTO: 4.25 X10(6)UL
SODIUM SERPL-SCNC: 141 MMOL/L (ref 136–145)
SP GR UR STRIP.AUTO: 1.01 (ref 1–1.03)
TRIGL SERPL-MCNC: 89 MG/DL (ref 30–149)
TSI SER-ACNC: 3.27 MIU/ML (ref 0.36–3.74)
UROBILINOGEN UR STRIP.AUTO-MCNC: <2 MG/DL
VIT D+METAB SERPL-MCNC: 27.6 NG/ML (ref 30–100)
VLDLC SERPL CALC-MCNC: 15 MG/DL (ref 0–30)
WBC # BLD AUTO: 6.1 X10(3) UL (ref 4–11)

## 2022-11-09 PROCEDURE — 81001 URINALYSIS AUTO W/SCOPE: CPT

## 2022-11-09 PROCEDURE — 80053 COMPREHEN METABOLIC PANEL: CPT

## 2022-11-09 PROCEDURE — 80061 LIPID PANEL: CPT

## 2022-11-09 PROCEDURE — 87086 URINE CULTURE/COLONY COUNT: CPT

## 2022-11-09 PROCEDURE — 85025 COMPLETE CBC W/AUTO DIFF WBC: CPT

## 2022-11-09 PROCEDURE — 84443 ASSAY THYROID STIM HORMONE: CPT

## 2022-11-09 PROCEDURE — 36415 COLL VENOUS BLD VENIPUNCTURE: CPT

## 2022-11-09 PROCEDURE — 82306 VITAMIN D 25 HYDROXY: CPT

## 2023-01-03 ENCOUNTER — OFFICE VISIT (OUTPATIENT)
Facility: LOCATION | Age: 63
End: 2023-01-03
Payer: MEDICARE

## 2023-01-03 DIAGNOSIS — R49.0 HOARSENESS: ICD-10-CM

## 2023-01-03 DIAGNOSIS — K21.9 LARYNGOPHARYNGEAL REFLUX: Primary | ICD-10-CM

## 2023-01-03 PROCEDURE — 99213 OFFICE O/P EST LOW 20 MIN: CPT | Performed by: OTOLARYNGOLOGY

## 2023-01-03 PROCEDURE — 31575 DIAGNOSTIC LARYNGOSCOPY: CPT | Performed by: OTOLARYNGOLOGY

## 2023-01-03 RX ORDER — OMEPRAZOLE 40 MG/1
CAPSULE, DELAYED RELEASE ORAL
Qty: 90 CAPSULE | Refills: 3 | Status: SHIPPED | OUTPATIENT
Start: 2023-01-03 | End: 2023-01-06

## 2023-01-06 RX ORDER — OMEPRAZOLE 40 MG/1
CAPSULE, DELAYED RELEASE ORAL
Qty: 90 CAPSULE | Refills: 3 | Status: SHIPPED | OUTPATIENT
Start: 2023-01-06

## 2023-01-09 ENCOUNTER — LAB ENCOUNTER (OUTPATIENT)
Dept: LAB | Age: 63
End: 2023-01-09
Attending: INTERNAL MEDICINE
Payer: MEDICARE

## 2023-01-09 DIAGNOSIS — R10.9 ABDOMINAL PAIN: Primary | ICD-10-CM

## 2023-01-09 DIAGNOSIS — K21.9 GERD (GASTROESOPHAGEAL REFLUX DISEASE): ICD-10-CM

## 2023-01-09 PROCEDURE — 83013 H PYLORI (C-13) BREATH: CPT

## 2023-01-11 LAB — H. PYLORI BREATH TEST: NEGATIVE

## 2023-02-07 ENCOUNTER — OFFICE VISIT (OUTPATIENT)
Facility: LOCATION | Age: 63
End: 2023-02-07
Payer: MEDICARE

## 2023-02-07 DIAGNOSIS — K21.9 LARYNGOPHARYNGEAL REFLUX: Primary | ICD-10-CM

## 2023-02-07 DIAGNOSIS — J04.0 ACUTE LARYNGITIS: ICD-10-CM

## 2023-02-07 PROCEDURE — 99213 OFFICE O/P EST LOW 20 MIN: CPT | Performed by: OTOLARYNGOLOGY

## 2023-02-07 PROCEDURE — 31575 DIAGNOSTIC LARYNGOSCOPY: CPT | Performed by: OTOLARYNGOLOGY

## 2023-02-07 RX ORDER — AMOXICILLIN AND CLAVULANATE POTASSIUM 875; 125 MG/1; MG/1
1 TABLET, FILM COATED ORAL EVERY 12 HOURS
Qty: 20 TABLET | Refills: 0 | Status: SHIPPED | OUTPATIENT
Start: 2023-02-07

## 2023-04-14 ENCOUNTER — LAB ENCOUNTER (OUTPATIENT)
Dept: LAB | Age: 63
End: 2023-04-14
Attending: INTERNAL MEDICINE
Payer: MEDICARE

## 2023-04-14 ENCOUNTER — HOSPITAL ENCOUNTER (OUTPATIENT)
Dept: CT IMAGING | Age: 63
Discharge: HOME | End: 2023-04-14
Attending: INTERNAL MEDICINE
Payer: MEDICARE

## 2023-04-14 DIAGNOSIS — N39.0 UTI (URINARY TRACT INFECTION): Primary | ICD-10-CM

## 2023-04-14 DIAGNOSIS — R63.4 WEIGHT LOSS: ICD-10-CM

## 2023-04-14 LAB
BILIRUB UR QL STRIP.AUTO: NEGATIVE
CLARITY UR REFRACT.AUTO: CLEAR
COLOR UR AUTO: YELLOW
GLUCOSE UR STRIP.AUTO-MCNC: NEGATIVE MG/DL
KETONES UR STRIP.AUTO-MCNC: NEGATIVE MG/DL
LEUKOCYTE ESTERASE UR QL STRIP.AUTO: NEGATIVE
NITRITE UR QL STRIP.AUTO: NEGATIVE
PH UR STRIP.AUTO: 6 [PH] (ref 5–8)
PROT UR STRIP.AUTO-MCNC: NEGATIVE MG/DL
SP GR UR STRIP.AUTO: 1.01 (ref 1–1.03)
UROBILINOGEN UR STRIP.AUTO-MCNC: <2 MG/DL

## 2023-04-14 PROCEDURE — 87086 URINE CULTURE/COLONY COUNT: CPT

## 2023-04-14 PROCEDURE — 81001 URINALYSIS AUTO W/SCOPE: CPT

## 2023-04-14 PROCEDURE — 87077 CULTURE AEROBIC IDENTIFY: CPT

## 2023-04-14 PROCEDURE — 87186 SC STD MICRODIL/AGAR DIL: CPT

## 2023-04-14 PROCEDURE — 71250 CT THORAX DX C-: CPT | Performed by: INTERNAL MEDICINE

## 2023-04-17 ENCOUNTER — OFFICE VISIT (OUTPATIENT)
Dept: NEUROLOGY | Facility: CLINIC | Age: 63
End: 2023-04-17
Payer: MEDICARE

## 2023-04-17 VITALS
SYSTOLIC BLOOD PRESSURE: 134 MMHG | HEIGHT: 67 IN | HEART RATE: 80 BPM | BODY MASS INDEX: 26.21 KG/M2 | RESPIRATION RATE: 16 BRPM | WEIGHT: 167 LBS | DIASTOLIC BLOOD PRESSURE: 78 MMHG

## 2023-04-17 DIAGNOSIS — M54.2 NECK PAIN: Primary | ICD-10-CM

## 2023-04-17 DIAGNOSIS — G95.0 SYRINX OF SPINAL CORD (HCC): ICD-10-CM

## 2023-04-17 DIAGNOSIS — M54.12 CERVICAL RADICULAR PAIN: ICD-10-CM

## 2023-04-17 PROCEDURE — 99204 OFFICE O/P NEW MOD 45 MIN: CPT | Performed by: OTHER

## 2023-04-17 RX ORDER — IBUPROFEN 200 MG
200 TABLET ORAL EVERY 6 HOURS PRN
COMMUNITY

## 2023-09-14 ENCOUNTER — LAB ENCOUNTER (OUTPATIENT)
Dept: LAB | Age: 63
End: 2023-09-14
Attending: INTERNAL MEDICINE
Payer: MEDICARE

## 2023-09-14 DIAGNOSIS — N39.0 UTI (URINARY TRACT INFECTION): ICD-10-CM

## 2023-09-14 DIAGNOSIS — E03.9 HYPOTHYROIDISM (ACQUIRED): Primary | ICD-10-CM

## 2023-09-14 LAB
ALBUMIN SERPL-MCNC: 3.8 G/DL (ref 3.4–5)
ALBUMIN/GLOB SERPL: 0.9 {RATIO} (ref 1–2)
ALP LIVER SERPL-CCNC: 52 U/L
ALT SERPL-CCNC: 22 U/L
ANION GAP SERPL CALC-SCNC: 2 MMOL/L (ref 0–18)
AST SERPL-CCNC: 14 U/L (ref 15–37)
BASOPHILS # BLD AUTO: 0.03 X10(3) UL (ref 0–0.2)
BASOPHILS NFR BLD AUTO: 0.7 %
BILIRUB SERPL-MCNC: 0.5 MG/DL (ref 0.1–2)
BILIRUB UR QL STRIP.AUTO: NEGATIVE
BUN BLD-MCNC: 10 MG/DL (ref 7–18)
CALCIUM BLD-MCNC: 9.3 MG/DL (ref 8.5–10.1)
CHLORIDE SERPL-SCNC: 107 MMOL/L (ref 98–112)
CHOLEST SERPL-MCNC: 179 MG/DL (ref ?–200)
CLARITY UR REFRACT.AUTO: CLEAR
CO2 SERPL-SCNC: 30 MMOL/L (ref 21–32)
CREAT BLD-MCNC: 0.92 MG/DL
EGFRCR SERPLBLD CKD-EPI 2021: 70 ML/MIN/1.73M2 (ref 60–?)
EOSINOPHIL # BLD AUTO: 0.04 X10(3) UL (ref 0–0.7)
EOSINOPHIL NFR BLD AUTO: 1 %
ERYTHROCYTE [DISTWIDTH] IN BLOOD BY AUTOMATED COUNT: 12.2 %
FASTING PATIENT LIPID ANSWER: YES
FASTING STATUS PATIENT QL REPORTED: YES
GLOBULIN PLAS-MCNC: 4.3 G/DL (ref 2.8–4.4)
GLUCOSE BLD-MCNC: 107 MG/DL (ref 70–99)
GLUCOSE UR STRIP.AUTO-MCNC: NORMAL MG/DL
HCT VFR BLD AUTO: 40.2 %
HDLC SERPL-MCNC: 57 MG/DL (ref 40–59)
HGB BLD-MCNC: 13.1 G/DL
IMM GRANULOCYTES # BLD AUTO: 0.01 X10(3) UL (ref 0–1)
IMM GRANULOCYTES NFR BLD: 0.2 %
KETONES UR STRIP.AUTO-MCNC: NEGATIVE MG/DL
LDLC SERPL CALC-MCNC: 107 MG/DL (ref ?–100)
LEUKOCYTE ESTERASE UR QL STRIP.AUTO: NEGATIVE
LYMPHOCYTES # BLD AUTO: 1.51 X10(3) UL (ref 1–4)
LYMPHOCYTES NFR BLD AUTO: 36.3 %
MCH RBC QN AUTO: 30.1 PG (ref 26–34)
MCHC RBC AUTO-ENTMCNC: 32.6 G/DL (ref 31–37)
MCV RBC AUTO: 92.4 FL
MONOCYTES # BLD AUTO: 0.33 X10(3) UL (ref 0.1–1)
MONOCYTES NFR BLD AUTO: 7.9 %
NEUTROPHILS # BLD AUTO: 2.24 X10 (3) UL (ref 1.5–7.7)
NEUTROPHILS # BLD AUTO: 2.24 X10(3) UL (ref 1.5–7.7)
NEUTROPHILS NFR BLD AUTO: 53.9 %
NITRITE UR QL STRIP.AUTO: NEGATIVE
NONHDLC SERPL-MCNC: 122 MG/DL (ref ?–130)
OSMOLALITY SERPL CALC.SUM OF ELEC: 288 MOSM/KG (ref 275–295)
PH UR STRIP.AUTO: 6.5 [PH] (ref 5–8)
PLATELET # BLD AUTO: 250 10(3)UL (ref 150–450)
POTASSIUM SERPL-SCNC: 4.4 MMOL/L (ref 3.5–5.1)
PROT SERPL-MCNC: 8.1 G/DL (ref 6.4–8.2)
PROT UR STRIP.AUTO-MCNC: NEGATIVE MG/DL
RBC # BLD AUTO: 4.35 X10(6)UL
RBC UR QL AUTO: NEGATIVE
SODIUM SERPL-SCNC: 139 MMOL/L (ref 136–145)
SP GR UR STRIP.AUTO: 1.01 (ref 1–1.03)
TRIGL SERPL-MCNC: 83 MG/DL (ref 30–149)
TSI SER-ACNC: 4.37 MIU/ML (ref 0.36–3.74)
UROBILINOGEN UR STRIP.AUTO-MCNC: NORMAL MG/DL
VIT D+METAB SERPL-MCNC: 56.6 NG/ML (ref 30–100)
VLDLC SERPL CALC-MCNC: 14 MG/DL (ref 0–30)
WBC # BLD AUTO: 4.2 X10(3) UL (ref 4–11)

## 2023-09-14 PROCEDURE — 80053 COMPREHEN METABOLIC PANEL: CPT

## 2023-09-14 PROCEDURE — 84443 ASSAY THYROID STIM HORMONE: CPT

## 2023-09-14 PROCEDURE — 82306 VITAMIN D 25 HYDROXY: CPT

## 2023-09-14 PROCEDURE — 36415 COLL VENOUS BLD VENIPUNCTURE: CPT

## 2023-09-14 PROCEDURE — 85025 COMPLETE CBC W/AUTO DIFF WBC: CPT

## 2023-09-14 PROCEDURE — 80061 LIPID PANEL: CPT

## 2023-09-14 PROCEDURE — 81003 URINALYSIS AUTO W/O SCOPE: CPT

## 2023-11-27 ENCOUNTER — HOSPITAL ENCOUNTER (OUTPATIENT)
Dept: GENERAL RADIOLOGY | Age: 63
Discharge: HOME | End: 2023-11-27
Attending: INTERNAL MEDICINE
Payer: MEDICARE

## 2023-11-27 DIAGNOSIS — M79.89 SWELLING OF CLAVICULAR REGION: ICD-10-CM

## 2023-11-27 PROCEDURE — 73000 X-RAY EXAM OF COLLAR BONE: CPT | Performed by: INTERNAL MEDICINE

## 2023-11-27 PROCEDURE — 73030 X-RAY EXAM OF SHOULDER: CPT | Performed by: INTERNAL MEDICINE

## 2023-11-28 ENCOUNTER — LAB ENCOUNTER (OUTPATIENT)
Dept: LAB | Age: 63
End: 2023-11-28
Attending: INTERNAL MEDICINE
Payer: MEDICARE

## 2023-11-28 DIAGNOSIS — Z12.11 COLON CANCER SCREENING: Primary | ICD-10-CM

## 2023-11-28 DIAGNOSIS — K21.9 GERD (GASTROESOPHAGEAL REFLUX DISEASE): ICD-10-CM

## 2023-11-28 PROCEDURE — 87338 HPYLORI STOOL AG IA: CPT

## 2023-11-28 PROCEDURE — 82272 OCCULT BLD FECES 1-3 TESTS: CPT

## 2023-11-30 LAB — H PYLORI AG STL QL IA: NEGATIVE

## 2023-12-08 ENCOUNTER — TELEPHONE (OUTPATIENT)
Dept: ORTHOPEDICS CLINIC | Facility: CLINIC | Age: 63
End: 2023-12-08

## 2023-12-08 DIAGNOSIS — M79.642 LEFT HAND PAIN: Primary | ICD-10-CM

## 2023-12-11 ENCOUNTER — HOSPITAL ENCOUNTER (OUTPATIENT)
Dept: GENERAL RADIOLOGY | Age: 63
Discharge: HOME | End: 2023-12-11
Attending: FAMILY MEDICINE
Payer: MEDICARE

## 2023-12-11 DIAGNOSIS — M79.641 RIGHT HAND PAIN: ICD-10-CM

## 2023-12-11 DIAGNOSIS — M79.642 LEFT HAND PAIN: ICD-10-CM

## 2023-12-11 PROCEDURE — 73130 X-RAY EXAM OF HAND: CPT | Performed by: FAMILY MEDICINE

## 2023-12-21 ENCOUNTER — OFFICE VISIT (OUTPATIENT)
Dept: ORTHOPEDICS CLINIC | Facility: CLINIC | Age: 63
End: 2023-12-21
Payer: MEDICARE

## 2023-12-21 VITALS — BODY MASS INDEX: 27.82 KG/M2 | HEIGHT: 64.96 IN | WEIGHT: 167 LBS

## 2023-12-21 DIAGNOSIS — M54.12 CERVICAL RADICULOPATHY: Primary | ICD-10-CM

## 2023-12-21 PROCEDURE — 99204 OFFICE O/P NEW MOD 45 MIN: CPT | Performed by: FAMILY MEDICINE

## 2023-12-21 RX ORDER — GABAPENTIN 300 MG/1
300 CAPSULE ORAL NIGHTLY
Qty: 30 CAPSULE | Refills: 1 | Status: SHIPPED | OUTPATIENT
Start: 2023-12-21

## 2024-01-07 ENCOUNTER — HOSPITAL ENCOUNTER (OUTPATIENT)
Age: 64
Discharge: HOME | End: 2024-01-07
Attending: EMERGENCY MEDICINE
Payer: MEDICARE

## 2024-01-07 VITALS
OXYGEN SATURATION: 97 % | DIASTOLIC BLOOD PRESSURE: 74 MMHG | HEART RATE: 85 BPM | HEIGHT: 66 IN | TEMPERATURE: 98 F | WEIGHT: 175 LBS | RESPIRATION RATE: 20 BRPM | BODY MASS INDEX: 28.13 KG/M2 | SYSTOLIC BLOOD PRESSURE: 116 MMHG

## 2024-01-07 DIAGNOSIS — R11.2 NAUSEA AND VOMITING IN ADULT: Primary | ICD-10-CM

## 2024-01-07 DIAGNOSIS — K21.9 GASTROESOPHAGEAL REFLUX DISEASE, UNSPECIFIED WHETHER ESOPHAGITIS PRESENT: ICD-10-CM

## 2024-01-07 LAB
#MXD IC: 0.7 X10ˆ3/UL (ref 0.1–1)
BILIRUB UR QL STRIP: NEGATIVE
BUN BLD-MCNC: 13 MG/DL (ref 7–18)
CHLORIDE BLD-SCNC: 103 MMOL/L (ref 98–112)
CLARITY UR: CLEAR
CO2 BLD-SCNC: 26 MMOL/L (ref 21–32)
COLOR UR: YELLOW
CREAT BLD-MCNC: 0.6 MG/DL
EGFRCR SERPLBLD CKD-EPI 2021: 100 ML/MIN/1.73M2 (ref 60–?)
GLUCOSE BLD-MCNC: 112 MG/DL (ref 70–99)
GLUCOSE UR STRIP-MCNC: NEGATIVE MG/DL
HCT VFR BLD AUTO: 38.1 %
HCT VFR BLD CALC: 35 %
HGB BLD-MCNC: 12.2 G/DL
ISTAT IONIZED CALCIUM FOR CHEM 8: 1.19 MMOL/L (ref 1.12–1.32)
KETONES UR STRIP-MCNC: NEGATIVE MG/DL
LYMPHOCYTES # BLD AUTO: 1.6 X10ˆ3/UL (ref 1–4)
LYMPHOCYTES NFR BLD AUTO: 21 %
MCH RBC QN AUTO: 29.3 PG (ref 26–34)
MCHC RBC AUTO-ENTMCNC: 32 G/DL (ref 31–37)
MCV RBC AUTO: 91.6 FL (ref 80–100)
MIXED CELL %: 9.1 %
NEUTROPHILS # BLD AUTO: 5.1 X10ˆ3/UL (ref 1.5–7.7)
NEUTROPHILS NFR BLD AUTO: 69.9 %
NITRITE UR QL STRIP: NEGATIVE
PH UR STRIP: 7 [PH]
PLATELET # BLD AUTO: 250 X10ˆ3/UL (ref 150–450)
POCT INFLUENZA A: NEGATIVE
POCT INFLUENZA B: NEGATIVE
POTASSIUM BLD-SCNC: 4.4 MMOL/L (ref 3.6–5.1)
PROT UR STRIP-MCNC: NEGATIVE MG/DL
RBC # BLD AUTO: 4.16 X10ˆ6/UL
SARS-COV-2 RNA RESP QL NAA+PROBE: NOT DETECTED
SODIUM BLD-SCNC: 139 MMOL/L (ref 136–145)
SP GR UR STRIP: 1.01
UROBILINOGEN UR STRIP-ACNC: <2 MG/DL
WBC # BLD AUTO: 7.4 X10ˆ3/UL (ref 4–11)

## 2024-01-07 PROCEDURE — S0028 INJECTION, FAMOTIDINE, 20 MG: HCPCS

## 2024-01-07 PROCEDURE — 80047 BASIC METABLC PNL IONIZED CA: CPT

## 2024-01-07 PROCEDURE — 96374 THER/PROPH/DIAG INJ IV PUSH: CPT

## 2024-01-07 PROCEDURE — 99214 OFFICE O/P EST MOD 30 MIN: CPT

## 2024-01-07 PROCEDURE — 87086 URINE CULTURE/COLONY COUNT: CPT | Performed by: EMERGENCY MEDICINE

## 2024-01-07 PROCEDURE — 99204 OFFICE O/P NEW MOD 45 MIN: CPT

## 2024-01-07 PROCEDURE — 87502 INFLUENZA DNA AMP PROBE: CPT | Performed by: EMERGENCY MEDICINE

## 2024-01-07 PROCEDURE — 96375 TX/PRO/DX INJ NEW DRUG ADDON: CPT

## 2024-01-07 PROCEDURE — 85025 COMPLETE CBC W/AUTO DIFF WBC: CPT | Performed by: EMERGENCY MEDICINE

## 2024-01-07 PROCEDURE — 96361 HYDRATE IV INFUSION ADD-ON: CPT

## 2024-01-07 PROCEDURE — 81002 URINALYSIS NONAUTO W/O SCOPE: CPT

## 2024-01-07 RX ORDER — FAMOTIDINE 10 MG/ML
20 INJECTION, SOLUTION INTRAVENOUS ONCE
Status: COMPLETED | OUTPATIENT
Start: 2024-01-07 | End: 2024-01-07

## 2024-01-07 RX ORDER — FAMOTIDINE 20 MG/1
20 TABLET, FILM COATED ORAL 2 TIMES DAILY PRN
Qty: 30 TABLET | Refills: 0 | Status: SHIPPED | OUTPATIENT
Start: 2024-01-07 | End: 2024-02-06

## 2024-01-07 RX ORDER — ONDANSETRON 2 MG/ML
2 INJECTION INTRAMUSCULAR; INTRAVENOUS ONCE
Status: COMPLETED | OUTPATIENT
Start: 2024-01-07 | End: 2024-01-07

## 2024-01-07 RX ORDER — ONDANSETRON 4 MG/1
4 TABLET, ORALLY DISINTEGRATING ORAL EVERY 4 HOURS PRN
Qty: 10 TABLET | Refills: 0 | Status: SHIPPED | OUTPATIENT
Start: 2024-01-07 | End: 2024-01-14

## 2024-01-07 RX ORDER — SODIUM CHLORIDE 9 MG/ML
INJECTION, SOLUTION INTRAVENOUS ONCE
Status: COMPLETED | OUTPATIENT
Start: 2024-01-07 | End: 2024-01-07

## 2024-01-07 NOTE — ED PROVIDER NOTES
Patient Seen in: [facility]      History     Chief Complaint   Patient presents with    Abdomen/Flank Pain     Stated Complaint: headache vomiting stomach issues    Subjective:   HPI    Interpretation via  phone.  Bosnian language.  63-year-old female with a 4-day history of nasal congestion and cough without fever associated with intermittent vomiting but no diarrhea.  Occasional shortness of breath especially when sleeping at night but feels better in the morning.  No chest pain.  No abdominal pain.  Close exposure who was treated with antibiotics by his primary care physician.    Objective:   Past Medical History:   Diagnosis Date    GERD (gastroesophageal reflux disease)     Hyperlipidemia     IPMN (intraductal papillary mucinous neoplasm) 07/2015    Side-branch IPMN, s/p EUS in 2016. MRCP recommeded annually    Aleksandra Hunt syndrome (geniculate herpes zoster)     Thyroid disease               Past Surgical History:   Procedure Laterality Date    CHOLECYSTECTOMY      CHOLECYSTECTOMY      COLONOSCOPY  2015    EGD  2015    ELECTROCARDIOGRAM, COMPLETE  11/19/2012    scanned to media tab    REMOVAL GALLBLADDER                  Social History     Socioeconomic History    Marital status:    Tobacco Use    Smoking status: Never    Smokeless tobacco: Never   Vaping Use    Vaping Use: Never used   Substance and Sexual Activity    Alcohol use: No    Drug use: No    Sexual activity: Not Currently     Partners: Male   Other Topics Concern    Pt has a pacemaker No    Pt has a defibrillator No    Reaction to local anesthetic No    Caffeine Concern Yes     Comment: 1 cup a day    Exercise No    Seat Belt Yes              Review of Systems    Positive for stated complaint: headache vomiting stomach issues  Other systems are as noted in HPI.  Constitutional and vital signs reviewed.      All other systems reviewed and negative except as noted above.    Physical Exam     ED Triage Vitals  [01/07/24 0829]   /74   Pulse 85   Resp 20   Temp 98.1 °F (36.7 °C)   Temp src Temporal   SpO2 97 %   O2 Device None (Room air)       Current:/74   Pulse 85   Temp 98.1 °F (36.7 °C) (Temporal)   Resp 20   Ht 167.6 cm (5' 6\")   Wt 79.4 kg   SpO2 97%   BMI 28.25 kg/m²         Physical Exam    General appearance: This is a middle-aged female sitting on a gurney.  Vital signs were reviewed per nurses notes.  Patient is afebrile.  HEENT: Normocephalic atraumatic.  Anicteric sclera.  Oral mucosa is moist.  Oropharynx is normal.  Neck: No adenopathy or thyromegaly.  Lungs are clear to auscultation.  Heart exam: Normal S1-S2 without extra sounds or murmurs.  Regular rate and rhythm.  Abdomen: NABS.  Flat, soft with mild epigastric and right upper quadrant tenderness and guarding without masses or rebound.  Mcadams sign is negative.  Skin is dry without rashes or lesions.  Extremities are atraumatic.  Neuroexam: Awake, conversive and moving all 4 extremities well.    ED Course     Labs Reviewed   POCT ISTAT CHEM8 CARTRIDGE - Abnormal; Notable for the following components:       Result Value    ISTAT Glucose 112 (*)     All other components within normal limits   [facility] POCT URINALYSIS DIPSTICK - Abnormal; Notable for the following components:    Blood, Urine Trace-Intact (*)     Leukocyte esterase urine Trace (*)     All other components within normal limits   RAPID SARS-COV-2 BY PCR - Normal   POCT FLU TEST - Normal    Narrative:     This assay is a rapid molecular in vitro test utilizing nucleic acid amplification of influenza A and B viral RNA.   POCT CBC   URINE CULTURE, ROUTINE             Intravenous access was obtained.  IV fluids were administered as well as antiemetics.  Laboratory studies were drawn.    Test results and treatment plan were discussed.  Pepcid was administered in the emergency department.         MDM      #1.  Nausea and vomiting in an adult.  Clinically no evidence of dehydration.   Also negative for COVID and influenza.  No leukocytosis to suggest underlying bacterial infection that requires further diagnostic workup.  Treated with antiemetics.  2.  GE reflux disease.  Patient does complain about acidity with eating and with the vomiting.  H2 blocker initiated.  Patient requested testing for this and was referred back to her primary care physician for outpatient referral to GI.                                   Medical Decision Making      Disposition and Plan     Clinical Impression:  1. Nausea and vomiting in adult    2. Gastroesophageal reflux disease, unspecified whether esophagitis present         Disposition:  Discharge  1/7/2024  9:49 am    Follow-up:  Hayden Damico MD  [address] [place], [place] [id number]  [phone]                Medications Prescribed:  Current Discharge Medication List        START taking these medications    Details   ondansetron 4 MG Oral Tablet Dispersible Take 1 tablet (4 mg total) by mouth every 4 (four) hours as needed for Nausea.  Qty: 10 tablet, Refills: 0      famotidine (PEPCID) 20 MG Oral Tab Take 1 tablet (20 mg total) by mouth 2 (two) times daily as needed for Heartburn.  Qty: 30 tablet, Refills: 0

## 2024-01-16 ENCOUNTER — HOSPITAL ENCOUNTER (OUTPATIENT)
Dept: MRI IMAGING | Age: 64
Discharge: HOME | End: 2024-01-16
Attending: Other
Payer: MEDICARE

## 2024-01-16 DIAGNOSIS — G95.0 SYRINX OF SPINAL CORD (HCC): ICD-10-CM

## 2024-01-16 DIAGNOSIS — M54.2 NECK PAIN: ICD-10-CM

## 2024-01-16 DIAGNOSIS — M54.12 CERVICAL RADICULAR PAIN: ICD-10-CM

## 2024-01-16 PROCEDURE — 72141 MRI NECK SPINE W/O DYE: CPT | Performed by: OTHER

## 2024-01-20 ENCOUNTER — LAB ENCOUNTER (OUTPATIENT)
Dept: LAB | Age: 64
End: 2024-01-20
Attending: INTERNAL MEDICINE
Payer: MEDICARE

## 2024-01-20 DIAGNOSIS — N39.0 UTI (URINARY TRACT INFECTION): Primary | ICD-10-CM

## 2024-01-20 DIAGNOSIS — M19.90 OSTEOARTHRITIS: ICD-10-CM

## 2024-01-20 DIAGNOSIS — E03.9 HYPOTHYROIDISM: ICD-10-CM

## 2024-01-20 DIAGNOSIS — R42 DIZZINESS: ICD-10-CM

## 2024-01-20 LAB
ALBUMIN SERPL-MCNC: 3.8 G/DL (ref 3.4–5)
ALBUMIN/GLOB SERPL: 0.8 {RATIO} (ref 1–2)
ALP LIVER SERPL-CCNC: 57 U/L
ANION GAP SERPL CALC-SCNC: 3 MMOL/L (ref 0–18)
AST SERPL-CCNC: 15 U/L (ref 15–37)
BASOPHILS # BLD AUTO: 0.05 X10(3) UL (ref 0–0.2)
BASOPHILS NFR BLD AUTO: 0.8 %
BILIRUB SERPL-MCNC: 0.4 MG/DL (ref 0.1–2)
BILIRUB UR QL STRIP.AUTO: NEGATIVE
BUN BLD-MCNC: 13 MG/DL (ref 9–23)
CALCIUM BLD-MCNC: 9.3 MG/DL (ref 8.5–10.1)
CHLORIDE SERPL-SCNC: 107 MMOL/L (ref 98–112)
CHOLEST SERPL-MCNC: 146 MG/DL (ref ?–200)
CLARITY UR REFRACT.AUTO: CLEAR
CO2 SERPL-SCNC: 30 MMOL/L (ref 21–32)
CREAT BLD-MCNC: 0.75 MG/DL
EGFRCR SERPLBLD CKD-EPI 2021: 89 ML/MIN/1.73M2 (ref 60–?)
EOSINOPHIL # BLD AUTO: 0.09 X10(3) UL (ref 0–0.7)
EOSINOPHIL NFR BLD AUTO: 1.4 %
ERYTHROCYTE [DISTWIDTH] IN BLOOD BY AUTOMATED COUNT: 12.2 %
EST. AVERAGE GLUCOSE BLD GHB EST-MCNC: 137 MG/DL (ref 68–126)
FASTING PATIENT LIPID ANSWER: YES
FASTING STATUS PATIENT QL REPORTED: YES
GLOBULIN PLAS-MCNC: 4.6 G/DL (ref 2.8–4.4)
GLUCOSE BLD-MCNC: 111 MG/DL (ref 70–99)
GLUCOSE UR STRIP.AUTO-MCNC: NORMAL MG/DL
HBA1C MFR BLD: 6.4 % (ref ?–5.7)
HCT VFR BLD AUTO: 41.7 %
HDLC SERPL-MCNC: 55 MG/DL (ref 40–59)
HGB BLD-MCNC: 13.5 G/DL
IMM GRANULOCYTES # BLD AUTO: 0.02 X10(3) UL (ref 0–1)
IMM GRANULOCYTES NFR BLD: 0.3 %
KETONES UR STRIP.AUTO-MCNC: NEGATIVE MG/DL
LDLC SERPL CALC-MCNC: 76 MG/DL (ref ?–100)
LEUKOCYTE ESTERASE UR QL STRIP.AUTO: NEGATIVE
LYMPHOCYTES # BLD AUTO: 1.85 X10(3) UL (ref 1–4)
LYMPHOCYTES NFR BLD AUTO: 29 %
MCH RBC QN AUTO: 29.9 PG (ref 26–34)
MCHC RBC AUTO-ENTMCNC: 32.4 G/DL (ref 31–37)
MCV RBC AUTO: 92.3 FL
MONOCYTES # BLD AUTO: 0.58 X10(3) UL (ref 0.1–1)
MONOCYTES NFR BLD AUTO: 9.1 %
NEUTROPHILS # BLD AUTO: 3.79 X10 (3) UL (ref 1.5–7.7)
NEUTROPHILS # BLD AUTO: 3.79 X10(3) UL (ref 1.5–7.7)
NEUTROPHILS NFR BLD AUTO: 59.4 %
NITRITE UR QL STRIP.AUTO: NEGATIVE
NONHDLC SERPL-MCNC: 91 MG/DL (ref ?–130)
OSMOLALITY SERPL CALC.SUM OF ELEC: 291 MOSM/KG (ref 275–295)
PH UR STRIP.AUTO: 6 [PH] (ref 5–8)
PLATELET # BLD AUTO: 247 10(3)UL (ref 150–450)
POTASSIUM SERPL-SCNC: 4.6 MMOL/L (ref 3.5–5.1)
PROT SERPL-MCNC: 8.4 G/DL (ref 6.4–8.2)
PROT UR STRIP.AUTO-MCNC: NEGATIVE MG/DL
RBC # BLD AUTO: 4.52 X10(6)UL
RBC UR QL AUTO: NEGATIVE
SODIUM SERPL-SCNC: 140 MMOL/L (ref 136–145)
SP GR UR STRIP.AUTO: 1.02 (ref 1–1.03)
TRIGL SERPL-MCNC: 80 MG/DL (ref 30–149)
TSI SER-ACNC: 1.59 MIU/ML (ref 0.36–3.74)
UROBILINOGEN UR STRIP.AUTO-MCNC: NORMAL MG/DL
VLDLC SERPL CALC-MCNC: 12 MG/DL (ref 0–30)
WBC # BLD AUTO: 6.4 X10(3) UL (ref 4–11)

## 2024-01-20 PROCEDURE — 36415 COLL VENOUS BLD VENIPUNCTURE: CPT

## 2024-01-20 PROCEDURE — 85025 COMPLETE CBC W/AUTO DIFF WBC: CPT

## 2024-01-20 PROCEDURE — 83036 HEMOGLOBIN GLYCOSYLATED A1C: CPT

## 2024-01-20 PROCEDURE — 81003 URINALYSIS AUTO W/O SCOPE: CPT

## 2024-01-20 PROCEDURE — 80061 LIPID PANEL: CPT

## 2024-01-20 PROCEDURE — 84443 ASSAY THYROID STIM HORMONE: CPT

## 2024-01-20 PROCEDURE — 80053 COMPREHEN METABOLIC PANEL: CPT

## 2024-04-18 ENCOUNTER — ANESTHESIA (OUTPATIENT)
Dept: SURGERY | Facility: HOSPITAL | Age: 64
End: 2024-04-18
Payer: MEDICARE

## 2024-04-18 ENCOUNTER — ANESTHESIA EVENT (OUTPATIENT)
Dept: SURGERY | Facility: HOSPITAL | Age: 64
End: 2024-04-18
Payer: MEDICARE

## 2024-04-18 ENCOUNTER — HOSPITAL ENCOUNTER (OUTPATIENT)
Facility: HOSPITAL | Age: 64
Setting detail: HOSPITAL OUTPATIENT SURGERY
Discharge: HOME | End: 2024-04-18
Attending: ORTHOPAEDIC SURGERY | Admitting: ORTHOPAEDIC SURGERY
Payer: MEDICARE

## 2024-04-18 VITALS
SYSTOLIC BLOOD PRESSURE: 119 MMHG | HEIGHT: 67 IN | RESPIRATION RATE: 16 BRPM | DIASTOLIC BLOOD PRESSURE: 69 MMHG | OXYGEN SATURATION: 100 % | HEART RATE: 56 BPM | TEMPERATURE: 98 F | WEIGHT: 168.63 LBS | BODY MASS INDEX: 26.47 KG/M2

## 2024-04-18 PROCEDURE — 01N50ZZ RELEASE MEDIAN NERVE, OPEN APPROACH: ICD-10-PCS | Performed by: ORTHOPAEDIC SURGERY

## 2024-04-18 RX ORDER — PROCHLORPERAZINE EDISYLATE 5 MG/ML
5 INJECTION INTRAMUSCULAR; INTRAVENOUS EVERY 8 HOURS PRN
Status: DISCONTINUED | OUTPATIENT
Start: 2024-04-18 | End: 2024-04-18

## 2024-04-18 RX ORDER — ACETAMINOPHEN 500 MG
1000 TABLET ORAL ONCE AS NEEDED
Status: DISCONTINUED | OUTPATIENT
Start: 2024-04-18 | End: 2024-04-18

## 2024-04-18 RX ORDER — SCOLOPAMINE TRANSDERMAL SYSTEM 1 MG/1
1 PATCH, EXTENDED RELEASE TRANSDERMAL ONCE
Status: DISCONTINUED | OUTPATIENT
Start: 2024-04-18 | End: 2024-04-18 | Stop reason: HOSPADM

## 2024-04-18 RX ORDER — DIPHENHYDRAMINE HYDROCHLORIDE 50 MG/ML
12.5 INJECTION INTRAMUSCULAR; INTRAVENOUS AS NEEDED
Status: DISCONTINUED | OUTPATIENT
Start: 2024-04-18 | End: 2024-04-18

## 2024-04-18 RX ORDER — HYDROCODONE BITARTRATE AND ACETAMINOPHEN 5; 325 MG/1; MG/1
1 TABLET ORAL ONCE AS NEEDED
Status: DISCONTINUED | OUTPATIENT
Start: 2024-04-18 | End: 2024-04-18

## 2024-04-18 RX ORDER — HYDROMORPHONE HYDROCHLORIDE 1 MG/ML
0.6 INJECTION, SOLUTION INTRAMUSCULAR; INTRAVENOUS; SUBCUTANEOUS EVERY 5 MIN PRN
Status: DISCONTINUED | OUTPATIENT
Start: 2024-04-18 | End: 2024-04-18

## 2024-04-18 RX ORDER — ONDANSETRON 2 MG/ML
4 INJECTION INTRAMUSCULAR; INTRAVENOUS EVERY 6 HOURS PRN
Status: DISCONTINUED | OUTPATIENT
Start: 2024-04-18 | End: 2024-04-18

## 2024-04-18 RX ORDER — LABETALOL HYDROCHLORIDE 5 MG/ML
5 INJECTION, SOLUTION INTRAVENOUS EVERY 5 MIN PRN
Status: DISCONTINUED | OUTPATIENT
Start: 2024-04-18 | End: 2024-04-18

## 2024-04-18 RX ORDER — LIDOCAINE HYDROCHLORIDE 10 MG/ML
INJECTION, SOLUTION INFILTRATION; PERINEURAL AS NEEDED
Status: DISCONTINUED | OUTPATIENT
Start: 2024-04-18 | End: 2024-04-18 | Stop reason: HOSPADM

## 2024-04-18 RX ORDER — ACETAMINOPHEN 500 MG
1000 TABLET ORAL ONCE
Status: DISCONTINUED | OUTPATIENT
Start: 2024-04-18 | End: 2024-04-18 | Stop reason: HOSPADM

## 2024-04-18 RX ORDER — HYDROMORPHONE HYDROCHLORIDE 1 MG/ML
0.2 INJECTION, SOLUTION INTRAMUSCULAR; INTRAVENOUS; SUBCUTANEOUS EVERY 5 MIN PRN
Status: DISCONTINUED | OUTPATIENT
Start: 2024-04-18 | End: 2024-04-18

## 2024-04-18 RX ORDER — HYDROCODONE BITARTRATE AND ACETAMINOPHEN 5; 325 MG/1; MG/1
2 TABLET ORAL ONCE AS NEEDED
Status: DISCONTINUED | OUTPATIENT
Start: 2024-04-18 | End: 2024-04-18

## 2024-04-18 RX ORDER — SODIUM CHLORIDE, SODIUM LACTATE, POTASSIUM CHLORIDE, CALCIUM CHLORIDE 600; 310; 30; 20 MG/100ML; MG/100ML; MG/100ML; MG/100ML
INJECTION, SOLUTION INTRAVENOUS CONTINUOUS
Status: DISCONTINUED | OUTPATIENT
Start: 2024-04-18 | End: 2024-04-18

## 2024-04-18 RX ORDER — MEPERIDINE HYDROCHLORIDE 25 MG/ML
12.5 INJECTION INTRAMUSCULAR; INTRAVENOUS; SUBCUTANEOUS AS NEEDED
Status: DISCONTINUED | OUTPATIENT
Start: 2024-04-18 | End: 2024-04-18

## 2024-04-18 RX ORDER — MIDAZOLAM HYDROCHLORIDE 1 MG/ML
INJECTION INTRAMUSCULAR; INTRAVENOUS AS NEEDED
Status: DISCONTINUED | OUTPATIENT
Start: 2024-04-18 | End: 2024-04-18 | Stop reason: SURG

## 2024-04-18 RX ORDER — CEFAZOLIN SODIUM/WATER 2 G/20 ML
2 SYRINGE (ML) INTRAVENOUS ONCE
Status: COMPLETED | OUTPATIENT
Start: 2024-04-18 | End: 2024-04-18

## 2024-04-18 RX ORDER — HYDROMORPHONE HYDROCHLORIDE 1 MG/ML
0.4 INJECTION, SOLUTION INTRAMUSCULAR; INTRAVENOUS; SUBCUTANEOUS EVERY 5 MIN PRN
Status: DISCONTINUED | OUTPATIENT
Start: 2024-04-18 | End: 2024-04-18

## 2024-04-18 RX ORDER — BUPIVACAINE HYDROCHLORIDE 5 MG/ML
INJECTION, SOLUTION EPIDURAL; INTRACAUDAL AS NEEDED
Status: DISCONTINUED | OUTPATIENT
Start: 2024-04-18 | End: 2024-04-18 | Stop reason: HOSPADM

## 2024-04-18 RX ORDER — NALOXONE HYDROCHLORIDE 0.4 MG/ML
0.08 INJECTION, SOLUTION INTRAMUSCULAR; INTRAVENOUS; SUBCUTANEOUS AS NEEDED
Status: DISCONTINUED | OUTPATIENT
Start: 2024-04-18 | End: 2024-04-18

## 2024-04-18 RX ADMIN — SODIUM CHLORIDE, SODIUM LACTATE, POTASSIUM CHLORIDE, CALCIUM CHLORIDE: 600; 310; 30; 20 INJECTION, SOLUTION INTRAVENOUS at 09:06:00

## 2024-04-18 RX ADMIN — CEFAZOLIN SODIUM/WATER 2 G: 2 G/20 ML SYRINGE (ML) INTRAVENOUS at 09:14:00

## 2024-04-18 RX ADMIN — MIDAZOLAM HYDROCHLORIDE 2 MG: 1 INJECTION INTRAMUSCULAR; INTRAVENOUS at 09:06:00

## 2024-04-18 NOTE — PROGRESS NOTES
OPERATIVE NOTE:    Patient Name: Cally Presley  Age:  63 year old  Sex: female  MRN: [id number]  : 1960  Date of Admission: 2024  Date of Surgery: 24  Primary Surgeon: Albino Dee MD  Assistant(s): Neisha ARTEAGA     Preoperative Diagnosis: Right Carpal Tunnel Syndrome; G56.00    Postoperative Diagnosis: Right Carpal Tunnel Syndrome; G56.00     Operation Performed: Right Open Carpal Tunnel Release; CPT code 47243      Implants: None     Anesthesia: MAC    Complications: none    Estimated Blood Loss: 1 mL    Tourniquet Time: See OR record    Specimens: none    Antibiotics: given    Indications/History:  Patient has carpal tunnel syndrome and has not had alleviation of symptoms with conservative treatment.  We discussed both nonoperative versus operative treatment. Since patient failed nonoperative treatment, we decided to move forward with surgical intervention.      Patient understands the risks and benefits of surgery and would like to proceed. The patient signed consents and the surgical site was marked.      Operative Findings:  The patient had a thickened transverse carpal ligament that was completely released.     Operative Procedure  The patient was seen in the preoperative holding area, where the correct site was marked.  All questions regarding the procedure and postoperative rehabilitation were discussed.  The consent was signed. H&P and allergies were reviewed.     Patient was brought back to the OR under stable conditions. Patient had a tourniquet placed in the high axillary region. Patient was then prepped and draped in the usual orthopedic fashion. Time-out was performed with OR staff and correct limb and informed consent and site markings were confirmed.     The patient's operative site was injected with 10 cc of a 50:50 mix of 1% Lidocaine and 0.5% marcaine without epinephrine after a timeout was performed.    Esmarch exsanguination was performed and the tourniquet was  raised to 250 mm Hg      An open carpal tunnel release was marked out on the hand based off of the veloz's line, the distal palmar crease and the 4th ray. Once the incision was marked out, the skin was incised and hemostasis was achieved with bipolar cautery. The soft tissue was sharply incised down to the palmar fascia. The patient's fascia was released and the transverse ligament was appreciated. The ligament was released and the nerve was identified. The nerve was hyperemic. Distally the ligament was released until we noted the fat surrounding the superficial arch and then proximally through the antebrachial fascia. Proximal dissection was done under direct visualization and scissor dissection while the nerve was protected. I utilized an Integra carpal tunnel knife and placed the carpal tunnel sled under the proximal leaflets of the transverse carpal ligament. While leaning the sled volarly, the knife was slid proximal to release the antebrachial fascia safely.    The wound was then closed with interrupted 4-0 nylon sutures and dressed with bacitracin, adaptic, 4x4s and a loose coban dressing    Tourniquet was let down and the fingers were well perfused with intact opposition     Patient was brought back to the recovery room without issue      Postoperative Protocol  Patient will go home today with pain medication and to follow up in 2 weeks where sutures will be removed.   Dressing can be removed in 2-3 days and covered with a bandaid    Albino Dee MD  [place] Certified Orthopedic Surgeon  Hand and Upper Extremity Specialist  [phone] Scheduling Line  [email or web address]

## 2024-04-18 NOTE — DISCHARGE INSTRUCTIONS
Albino Dee MD  Hand and Upper Extremity Surgeon  [facility]  [email or web address]  Follow up with MD/PA in 2 weeks   Follow up with OT in 3-4 weeks   Dressing- Soft, remove dressing in 3 days after surgery and cover incision with large band-aid  Pain Meds-Aleve/Tylenol    POST-OPERATIVE INSTRUCTIONS    Please visit [email or web address] to learn more about your condition, procedure, preoperative and postoperative care. There are also directions for postoperative pain control and home exercises. Look under the patient resources tab.    Follow up  Your follow up should have been made for you before the surgery  If you do not have a follow up day or have questions regarding it, call [phone]    Therapy:   If we discussed the need for therapy, they will reach out to you   Please call [phone] if they do not reach out to you in 2-3 days  If we discussed seeing therapy before your postoperative visit, please schedule accordingly    You may be placed in one of the following dressings after surgery:    (Yes) Soft dressing: if placed in a soft dressing with no hard plaster underneath, you can generally remove this dressing in 3 days after surgery and keep your incisions clean and dry. If you are not sure, do not hesitate to call our office regarding directions.    Showers  Keep dressing and/or wound dry until you see me in office    Activity  Please remain non-weight bearing to the operative extremity with only gentle activity, make a fist often and move fingers.    Driving  You should refrain from driving if you are on narcotic pain medications (Tramadol, Norco, Percocet… etc.). If you are off narcotic pain medications, you may start driving when you feel comfortable and safe doing so. Please use caution before starting to drive again.    Pain Medication Plan  Your pain medications should have been prescribed electronically to your pharmacy  The pain medications I have recommended for your procedure  are Aleve/Tylenol  Please call [phone] if you have issues with these medications    Over the counter pain medications  Start with over the counter pain medications first.  Naproxen (Brand name Aleve)   Comes in 220 mg tablets  Take this once in the morning and once at night initially, with food  If this does not control your pain, you can take 2 pills in the morning and 2 at night.   Do not take this if you have medical conditions affecting your heart/kidneys  Acetaminophen (Brand name Tylenol) Extra Strength  Comes in 650 mg tablets  You can take tylenol 3 times a day for a maximum of 3000 mg a day  Do not take this if you have medical conditions affecting your liver  Docusate Sodium (Brand name Colace)  This medication will help you if you become constipated from narcotic pain medications    Medications you may have been prescribed  If you were prescribed Norco, Vicodin, or Percocet, these should be taken to assist with sleeping at night, only if needed, for the first week. You may be given a written prescription, but many patients find they do not need to take these. Narcotic pain medications can lead to addiction, respiratory sedation, and death. Try to take these as little as possible if you can.    While on narcotic pain medication,  please refrain from:  Driving  Operating Heavy [employer]inery/Power Tools  Drinking alcohol  Staying by one's self  Making important decisions or signing legal documents    If you have any of the following signs or symptoms please proceed to your nearest Emergency Room:   Chest Pain  Shortness of Breath/ Difficulty Breathing  Excessive Bleeding  Anything else you deem an emergency    Please call the surgical team if you have the following:   Excessive swelling  Foul smelling odor from your wounds or dressings  Discharge from your surgical wounds  Persistent pain that does not get better with the prescription pain medication  Persistent nausea and/or vomiting  Fevers greater than  101.1 after the first 48 hrs post op  Dramatic changes to you post operative pain    You can resume all of your home medications after surgery with the following exceptions:   Immune Modulating Drugs: These are drugs for rheumatoid arthritis, psoriasis and chemotherapy. If you are taking these drugs please check with the surgical team.   Steroids: please ask the surgical team when to restart your normal steroid doses.   Blood thinners: these are medications like coumadin may be held post operatively in some cases, please check with your surgical team or your surgeon's nurse.      Please do not hesitate to contact us with questions or concerns:   If you have issues during the day, please call the orthopedic clinic at [phone]  If you have urgent questions after hours please call [phone] and ask to page the PA on call    Please visit [email or web address] to learn more about your condition, procedure, preoperative and postoperative care. There are also directions for postoperative pain control and home exercises.    If you’ve been happy with your care, please fill out our [employer] Survey emailed by [employer] or write a review on [employer] for me (link can be pulled up with the QR code below).   We appreciate all feedback!      Scan me or take a photo to write a Google Review!

## 2024-04-18 NOTE — ANESTHESIA POSTPROCEDURE EVALUATION
[facility] Patient Status:  Hospital Outpatient Surgery   Age/Gender 63 year old female MRN [id number]   [facility] PERIOPERATIVE SERVICE Attending No att. providers found   Hosp Day # 0 PCP KG DIA MD       Anesthesia Post-op Note    RIGHT CARPAL TUNNEL RELEASE    Procedure Summary       Date: 04/18/24 Room / Location:  MAIN OR 14 / [facility] MAIN OR    Anesthesia Start: 0906 Anesthesia Stop: 0935    Procedure: RIGHT CARPAL TUNNEL RELEASE (Right: Wrist) Diagnosis: (CARPAL TUNNEL SYNDROME OF RIGHT WRIST)    Surgeons: Albino Dee MD Anesthesiologist: Gema Diez MD    Anesthesia Type: MAC ASA Status: 2            Anesthesia Type: MAC    Vitals Value Taken Time   /69 04/18/24 1039   Temp 97.5 °F (36.4 °C) 04/18/24 0933   Pulse 62 04/18/24 1053   Resp 16 04/18/24 0933   SpO2 100 % 04/18/24 1053   Vitals shown include unfiled device data.    Patient Location: Same Day Surgery    Anesthesia Type: MAC    Airway Patency: patent    Postop Pain Control: adequate    Mental Status: preanesthetic baseline    Nausea/Vomiting: none    Cardiopulmonary/Hydration status: stable euvolemic    Complications: no apparent anesthesia related complications    Postop vital signs: stable    Dental Exam: Unchanged from Preop    Patient to be discharged from PACU when criteria met.

## 2024-04-18 NOTE — H&P
ORTHOPEDIC SURGERY H+P      Patient Name:Cally Perkinsjcic     Subjective:   HPI:    The patient is a 63 year old year old female      Daughter used for translation    Here today for right carpal tunnel release     Past Medical History       Past Medical History:   Diagnosis Date    IPMN (intraductal papillary mucinous neoplasm) 07/2015     Side-branch IPMN, s/p EUS in 2016. MRCP recommeded annually    [place] Hunt syndrome (geniculate herpes zoster)      Thyroid disease           Past Surgical History        Past Surgical History:   Procedure Laterality Date    CHOLECYSTECTOMY        COLONOSCOPY   2015    EGD   2015    ELECTROCARDIOGRAM, COMPLETE   11/19/2012     scanned to media tab    REMOVAL GALLBLADDER             Medications     Current Outpatient Medications:     Omeprazole 40 MG Oral Capsule Delayed Release, Take 40 mg by mouth daily., Disp: , Rfl:     simvastatin 20 MG Oral Tab, Take 20 mg by mouth nightly., Disp: , Rfl: 1    Levothyroxine Sodium 75 MCG Oral Tab, Take 75 mcg by mouth., Disp: , Rfl:      Allergies  No Known Allergies     Social History  Social History    Tobacco Use      Smoking status: Never      Smokeless tobacco: Never    Vaping Use      Vaping Use: Never used    Alcohol use: No    Drug use: No        Family History:        Family History   Problem Relation Age of Onset    Cancer Brother           lungs    Cancer Mother           liver    Cancer Sister           uterine cancer         Physical Exam:      Estimated body mass index is 27.25 kg/m² as calculated from the following:    Height as of 7/24/19: 5' 7\" (1.702 m).    Weight as of 2/9/22: 174 lb (78.9 kg).     Right Upper extremity exam:     Pertinent Exam Findings  Right hand with + phalen  Numbness to middle finger     Skin intact, No significant lesions or abnormalities except if mentioned above   Compartments soft      Palpatory:  No areas of pain or discomfort identified except for above exam findings  Range of Motion:  Elbows:  full arc of motion  unless specified above  Wrists: Full and symmetric arc of motion unless specified above  All fingers demonstrated a full, free and uninhibited range of motion unless specified above  Motor Exam                  Palmar Abduction, Thumb Opposition, EPL, FPL intact unless specified above  FDS and FDP, First dorsal interossei intact unless specified above  Sensory Exam                  Sensation intact to light touch to Median, Ulnar and Radial Nerve distribution unless specified above  Joint Stability:   No obvious dislocation, subluxation or significant laxity in upper extremity unless specified above  Vascular:  Radial/Ulnar Pulses 2+  unless specified above  All fingertips are pink with capillary refill less than 2 seconds unless specified above        Diagnostic Studies:            Diagnostic studies were reviewed with the patient      Assessment/Plan      This 63 year old year old female presents with the following diagnoses and plan:     1. Carpal tunnel syndrome of right wrist        Carpal Tunnel Syndrome  The diagnosis, prognosis, and treatment options were discussed, along with their risks , benefits,  and alternatives.    We discussed the pathophysiology of carpal tunnel syndrome.    Mild symptoms can be treated with night splints or steroid injections to minimize compression of or swelling around the median nerve.  Persistent or severe symptoms/signs (weakness, muscle atrophy) are best treated with carpal tunnel release to prevent further nerve injury.   We discussed that carpal tunnel release reliably improves night pain and prevents progression of the disease but that daytime numbness and atrophy may or may not improve over a 6-12 months following surgery     I have recommended Right carpal tunnel release under sedation     The patient understands the natural history of their disease and all their options.   Risks and benefits, as well as alternatives, of surgery were discussed in  detail.   Risks including infection, bleeding, damage to nerves, arteries, tendons, persistent pain, failure of surgery to relieve all pain/dysfunction/deformity, malunion, nonunion (if a bony injury), recurrence of problem, and need for further surgery were all discussed.  Risks for anesthesia complications, DVT and death were also discussed  All questions were answered and they agree to the plan.      Albino Dee MD, FAAOS  [facility] Certified Orthopedic Surgeon  Board Certified Hand and Upper Extremity Specialist  [phone] Scheduling Line  [email or web address]

## 2024-04-18 NOTE — OPERATIVE REPORT
OPERATIVE NOTE:    Patient Name: Cally Presley  Age:  63 year old  Sex: female  MRN: [id number]  : 1960  Date of Admission: 2024  Date of Surgery: 24  Primary Surgeon: Albino Dee MD  Assistant(s): Neisha ARTEAGA     Preoperative Diagnosis: Right Carpal Tunnel Syndrome; G56.00    Postoperative Diagnosis: Right Carpal Tunnel Syndrome; G56.00     Operation Performed: Right Open Carpal Tunnel Release; CPT code 21520      Implants: None     Anesthesia: MAC    Complications: none    Estimated Blood Loss: 1 mL    Tourniquet Time: See OR record    Specimens: none    Antibiotics: given    Indications/History:  Patient has carpal tunnel syndrome and has not had alleviation of symptoms with conservative treatment.  We discussed both nonoperative versus operative treatment. Since patient failed nonoperative treatment, we decided to move forward with surgical intervention.      Patient understands the risks and benefits of surgery and would like to proceed. The patient signed consents and the surgical site was marked.      Operative Findings:  The patient had a thickened transverse carpal ligament that was completely released.     Operative Procedure  The patient was seen in the preoperative holding area, where the correct site was marked.  All questions regarding the procedure and postoperative rehabilitation were discussed.  The consent was signed. H&P and allergies were reviewed.     Patient was brought back to the OR under stable conditions. Patient had a tourniquet placed in the high axillary region. Patient was then prepped and draped in the usual orthopedic fashion. Time-out was performed with OR staff and correct limb and informed consent and site markings were confirmed.     The patient's operative site was injected with 10 cc of a 50:50 mix of 1% Lidocaine and 0.5% marcaine without epinephrine after a timeout was performed.    Esmarch exsanguination was performed and the tourniquet was  raised to 250 mm Hg      An open carpal tunnel release was marked out on the hand based off of the veloz's line, the distal palmar crease and the 4th ray. Once the incision was marked out, the skin was incised and hemostasis was achieved with bipolar cautery. The soft tissue was sharply incised down to the palmar fascia. The patient's fascia was released and the transverse ligament was appreciated. The ligament was released and the nerve was identified. The nerve was hyperemic. Distally the ligament was released until we noted the fat surrounding the superficial arch and then proximally through the antebrachial fascia. Proximal dissection was done under direct visualization and scissor dissection while the nerve was protected. I utilized an Integra carpal tunnel knife and placed the carpal tunnel sled under the proximal leaflets of the transverse carpal ligament. While leaning the sled volarly, the knife was slid proximal to release the antebrachial fascia safely.    The wound was then closed with interrupted 4-0 nylon sutures and dressed with bacitracin, adaptic, 4x4s and a loose coban dressing    Tourniquet was let down and the fingers were well perfused with intact opposition     Patient was brought back to the recovery room without issue      Postoperative Protocol  Patient will go home today with pain medication and to follow up in 2 weeks where sutures will be removed.   Dressing can be removed in 2-3 days and covered with a bandaid    Albion Dee MD  [facility] Certified Orthopedic Surgeon  Hand and Upper Extremity Specialist  [phone] Scheduling Line  [email or web address]

## 2024-04-18 NOTE — ANESTHESIA PREPROCEDURE EVALUATION
PRE-OP EVALUATION    Patient Name: Cally Presley    Admit Diagnosis: CARPAL TUNNEL SYNDROME OF RIGHT WRIST    Pre-op Diagnosis: CARPAL TUNNEL SYNDROME OF RIGHT WRIST    RIGHT CARPAL TUNNEL RELEASE    Anesthesia Procedure: RIGHT CARPAL TUNNEL RELEASE (Right)    Surgeons and Role:     * Albino Dee MD - Primary    Pre-op vitals reviewed.  Temp: 98 °F (36.7 °C)  Pulse: 67  Resp: 16  BP: 113/71  SpO2: 98 %  Body mass index is 26.41 kg/m².    Current medications reviewed.  Hospital Medications:   [Transfer Hold] acetaminophen (Tylenol Extra Strength) tab 1,000 mg  1,000 mg Oral Once    [Transfer Hold] scopolamine (Transderm-Scop) 1 MG/3DAYS patch 1 patch  1 patch Transdermal Once    lactated ringers infusion   Intravenous Continuous    ceFAZolin (Ancef) 2 g in 20mL IV syringe premix  2 g Intravenous Once       Outpatient Medications:     Medications Prior to Admission   Medication Sig Dispense Refill Last Dose    [] famotidine (PEPCID) 20 MG Oral Tab Take 1 tablet (20 mg total) by mouth 2 (two) times daily as needed for Heartburn. 30 tablet 0     simvastatin 20 MG Oral Tab Take 1 tablet (20 mg total) by mouth nightly.  1 2024 at 2000    Levothyroxine Sodium 75 MCG Oral Tab Take 1 tablet (75 mcg total) by mouth.   2024 at 0330    naproxen 500 MG Oral Tab Take 1 tablet (500 mg total) by mouth as needed.  1 More than a month    ibuprofen 600 MG Oral Tab Take 1 tablet (600 mg total) by mouth every 6 (six) hours as needed for Pain.   More than a month       Allergies: Patient has no known allergies.      Anesthesia Evaluation    Patient summary reviewed.    Anesthetic Complications  (-) history of anesthetic complications         GI/Hepatic/Renal      (+) GERD                           Cardiovascular                     (+) hyperlipidemia                                  Endo/Other           (+) hypothyroidism                       Pulmonary                    (+) sleep apnea        Neuro/Psych                 (+) neuromuscular disease               Cystic mass of pancreas        Past Surgical History:   Procedure Laterality Date    Cholecystectomy      Cholecystectomy      Colonoscopy  2015    Egd  2015    Electrocardiogram, complete  11/19/2012    scanned to media tab    Removal gallbladder       Social History     Socioeconomic History    Marital status:    Tobacco Use    Smoking status: Never    Smokeless tobacco: Never   Vaping Use    Vaping status: Never Used   Substance and Sexual Activity    Alcohol use: No    Drug use: No    Sexual activity: Not Currently     Partners: Male   Other Topics Concern    Pt has a pacemaker No    Pt has a defibrillator No    Reaction to local anesthetic No    Caffeine Concern Yes     Comment: 1 cup a day    Exercise No    Seat Belt Yes     History   Drug Use No     Available pre-op labs reviewed.  Lab Results   Component Value Date    WBC 6.4 01/20/2024    RBC 4.52 01/20/2024    HGB 13.5 01/20/2024    HCT 41.7 01/20/2024    MCV 92.3 01/20/2024    MCH 29.9 01/20/2024    MCHC 32.4 01/20/2024    RDW 12.2 01/20/2024    .0 01/20/2024     Lab Results   Component Value Date     01/20/2024    K 4.6 01/20/2024     01/20/2024    CO2 30.0 01/20/2024    BUN 13 01/20/2024    CREATSERUM 0.75 01/20/2024     (H) 01/20/2024    CA 9.3 01/20/2024            Airway      Mallampati: II  Mouth opening: >3 FB  TM distance: 4 - 6 cm  Neck ROM: full Cardiovascular      Rhythm: regular  Rate: normal     Dental      Dental appliance(s): lower dentures and upper dentures       Pulmonary    Pulmonary exam normal.                 Other findings              ASA: 2   Plan: MAC  NPO status verified and     Post-procedure pain management plan discussed with surgeon and patient.      Plan/risks discussed with: patient                Present on Admission:  **None**

## 2024-11-19 ENCOUNTER — APPOINTMENT (OUTPATIENT)
Dept: CT IMAGING | Facility: HOSPITAL | Age: 64
End: 2024-11-19
Attending: EMERGENCY MEDICINE
Payer: MEDICARE

## 2024-11-19 ENCOUNTER — HOSPITAL ENCOUNTER (EMERGENCY)
Facility: HOSPITAL | Age: 64
Discharge: HOME | End: 2024-11-19
Attending: EMERGENCY MEDICINE
Payer: MEDICARE

## 2024-11-19 VITALS
DIASTOLIC BLOOD PRESSURE: 66 MMHG | HEIGHT: 67 IN | SYSTOLIC BLOOD PRESSURE: 112 MMHG | WEIGHT: 175 LBS | OXYGEN SATURATION: 99 % | TEMPERATURE: 98 F | RESPIRATION RATE: 17 BRPM | BODY MASS INDEX: 27.47 KG/M2 | HEART RATE: 67 BPM

## 2024-11-19 DIAGNOSIS — D25.9 UTERINE LEIOMYOMA, UNSPECIFIED LOCATION: ICD-10-CM

## 2024-11-19 DIAGNOSIS — R10.30 LOWER ABDOMINAL PAIN: Primary | ICD-10-CM

## 2024-11-19 LAB
ALBUMIN SERPL-MCNC: 4 G/DL (ref 3.2–4.8)
ALBUMIN/GLOB SERPL: 1.1 {RATIO} (ref 1–2)
ALP LIVER SERPL-CCNC: 52 U/L
ALT SERPL-CCNC: 12 U/L
ANION GAP SERPL CALC-SCNC: <0 MMOL/L (ref 0–18)
APTT PPP: 26.2 SECONDS (ref 23–36)
AST SERPL-CCNC: 17 U/L (ref ?–34)
BASOPHILS # BLD AUTO: 0.05 X10(3) UL (ref 0–0.2)
BASOPHILS NFR BLD AUTO: 0.6 %
BILIRUB SERPL-MCNC: 0.5 MG/DL (ref 0.2–1.1)
BILIRUB UR QL STRIP.AUTO: NEGATIVE
BUN BLD-MCNC: 12 MG/DL (ref 9–23)
CALCIUM BLD-MCNC: 9.5 MG/DL (ref 8.7–10.4)
CHLORIDE SERPL-SCNC: 110 MMOL/L (ref 98–112)
CLARITY UR REFRACT.AUTO: CLEAR
CO2 SERPL-SCNC: 27 MMOL/L (ref 21–32)
CREAT BLD-MCNC: 0.75 MG/DL
EGFRCR SERPLBLD CKD-EPI 2021: 88 ML/MIN/1.73M2 (ref 60–?)
EOSINOPHIL # BLD AUTO: 0.05 X10(3) UL (ref 0–0.7)
EOSINOPHIL NFR BLD AUTO: 0.6 %
ERYTHROCYTE [DISTWIDTH] IN BLOOD BY AUTOMATED COUNT: 12.4 %
GLOBULIN PLAS-MCNC: 3.7 G/DL (ref 2–3.5)
GLUCOSE BLD-MCNC: 115 MG/DL (ref 70–99)
GLUCOSE UR STRIP.AUTO-MCNC: NORMAL MG/DL
HCT VFR BLD AUTO: 39 %
HGB BLD-MCNC: 13.2 G/DL
IMM GRANULOCYTES # BLD AUTO: 0.02 X10(3) UL (ref 0–1)
IMM GRANULOCYTES NFR BLD: 0.2 %
INR BLD: 1.11 (ref 0.8–1.2)
KETONES UR STRIP.AUTO-MCNC: NEGATIVE MG/DL
LEUKOCYTE ESTERASE UR QL STRIP.AUTO: NEGATIVE
LYMPHOCYTES # BLD AUTO: 1.41 X10(3) UL (ref 1–4)
LYMPHOCYTES NFR BLD AUTO: 17.4 %
MCH RBC QN AUTO: 31 PG (ref 26–34)
MCHC RBC AUTO-ENTMCNC: 33.8 G/DL (ref 31–37)
MCV RBC AUTO: 91.5 FL
MONOCYTES # BLD AUTO: 0.59 X10(3) UL (ref 0.1–1)
MONOCYTES NFR BLD AUTO: 7.3 %
NEUTROPHILS # BLD AUTO: 5.98 X10 (3) UL (ref 1.5–7.7)
NEUTROPHILS # BLD AUTO: 5.98 X10(3) UL (ref 1.5–7.7)
NEUTROPHILS NFR BLD AUTO: 73.9 %
NITRITE UR QL STRIP.AUTO: NEGATIVE
OSMOLALITY SERPL CALC.SUM OF ELEC: 279 MOSM/KG (ref 275–295)
PH UR STRIP.AUTO: 6 [PH] (ref 5–8)
PLATELET # BLD AUTO: 202 10(3)UL (ref 150–450)
POTASSIUM SERPL-SCNC: 4 MMOL/L (ref 3.5–5.1)
PROT SERPL-MCNC: 7.7 G/DL (ref 5.7–8.2)
PROT UR STRIP.AUTO-MCNC: NEGATIVE MG/DL
PROTHROMBIN TIME: 14.4 SECONDS (ref 11.6–14.8)
RBC # BLD AUTO: 4.26 X10(6)UL
RBC UR QL AUTO: NEGATIVE
SODIUM SERPL-SCNC: 134 MMOL/L (ref 136–145)
SP GR UR STRIP.AUTO: 1.02 (ref 1–1.03)
UROBILINOGEN UR STRIP.AUTO-MCNC: NORMAL MG/DL
WBC # BLD AUTO: 8.1 X10(3) UL (ref 4–11)

## 2024-11-19 PROCEDURE — 85730 THROMBOPLASTIN TIME PARTIAL: CPT | Performed by: EMERGENCY MEDICINE

## 2024-11-19 PROCEDURE — 81003 URINALYSIS AUTO W/O SCOPE: CPT | Performed by: EMERGENCY MEDICINE

## 2024-11-19 PROCEDURE — 85610 PROTHROMBIN TIME: CPT | Performed by: EMERGENCY MEDICINE

## 2024-11-19 PROCEDURE — 99284 EMERGENCY DEPT VISIT MOD MDM: CPT

## 2024-11-19 PROCEDURE — 74177 CT ABD & PELVIS W/CONTRAST: CPT | Performed by: EMERGENCY MEDICINE

## 2024-11-19 PROCEDURE — 80053 COMPREHEN METABOLIC PANEL: CPT | Performed by: EMERGENCY MEDICINE

## 2024-11-19 PROCEDURE — 96374 THER/PROPH/DIAG INJ IV PUSH: CPT

## 2024-11-19 PROCEDURE — 85025 COMPLETE CBC W/AUTO DIFF WBC: CPT | Performed by: EMERGENCY MEDICINE

## 2024-11-19 RX ORDER — TRAMADOL HYDROCHLORIDE 50 MG/1
50 TABLET ORAL EVERY 8 HOURS PRN
Qty: 10 TABLET | Refills: 0 | Status: SHIPPED | OUTPATIENT
Start: 2024-11-19

## 2024-11-19 RX ORDER — KETOROLAC TROMETHAMINE 15 MG/ML
15 INJECTION, SOLUTION INTRAMUSCULAR; INTRAVENOUS ONCE
Status: COMPLETED | OUTPATIENT
Start: 2024-11-19 | End: 2024-11-19

## 2024-11-19 NOTE — ED INITIAL ASSESSMENT (HPI)
Patient ambulatory to ED with c/o pelvic pain since this morning. Per family, patient complains of new bumps in pelvic region

## 2024-11-19 NOTE — DISCHARGE INSTRUCTIONS
Rest at home  Motrin for pain at home  Return to the ER if symptoms worsen or if any other problems arise

## 2024-11-19 NOTE — ED PROVIDER NOTES
Patient Seen in: [facility] Emergency Department      History     Chief Complaint   Patient presents with    Eval-G     Stated Complaint: Pelvic pain    Subjective:   HPI      The patient is a 64-year-old female presents emergency room with a history of pelvic pain specifically left-sided pelvic pain which has been ongoing this morning.  The patient denies history of any upper abdominal pain.  The patient denies history of any vomiting or diarrhea.  The patient denies history of any painful urination.  The patient was worried about some new bumps in the region of her pelvic area per patient's daughter who is giving independent history and also interpreting at the bedside.  The patient has had previous gallbladder surgery and no other abdominal surgeries.  The patient denies history of any back pain.  The patient denies history of any other somatic complaints or discomfort at this time.  The patient feels pressure in her pelvic area at this time.    Objective:     No pertinent past medical history.            No pertinent past surgical history.              No pertinent social history.                Physical Exam     ED Triage Vitals [11/19/24 0702]   /81   Pulse 66   Resp 16   Temp 98.1 °F (36.7 °C)   Temp src Oral   SpO2 99 %   O2 Device None (Room air)       Current Vitals:   Vital Signs  BP: 112/66  Pulse: 67  Resp: 17  Temp: 98.1 °F (36.7 °C)  Temp src: Oral  MAP (mmHg): 81    Oxygen Therapy  SpO2: 99 %  O2 Device: None (Room air)        Physical Exam  GENERAL: Well-developed, well-nourished female sitting up breathing easily in no apparent distress.  Patient is nontoxic in appearance.  HEENT: Head is normocephalic, atraumatic. Pupils are 4 mm equally round and reactive to light. Oropharynx is clear. Mucous membranes are moist.  LUNGS: Clear to auscultation bilaterally with no wheeze. There is good equal air entry bilaterally.  HEART: Regular rate and rhythm. Normal S1, S2 no S3, or S4. No  murmur.  ABDOMEN: There is mild focal tenderness to palpation appreciated to the suprapubic area only with no other focal tenderness to palpation appreciated. There is no guarding, no rebound, no mass, and no organomegaly appreciated. There is normoactive bowel sounds. There is no hernia.  BACK: There is no evidence of any focal tenderness to palpation throughout the thoracic or lumbar spinous processes.  There is no overlying ecchymosis or rash appreciated.  GENITOURINARY: Exam done with female chaperone, Meaghan at the bedside shows no evidence of any external rash, no evidence of any abscess no evidence of any overlying erythema.  Patient speculum exam is unremarkable.  There is no evidence of any prolapse appreciated.  EXTREMITIES: There is no cyanosis, clubbing, or edema appreciated. Pulses are 2+ and equal in all 4 extremities.  NEURO: Patient is awake, alert and oriented to time place and person. Motor strength is 5 over 5 in all 4 extremities. There are no gross motor or sensory deficits appreciated.  Patient answering all questions appropriately.        ED Course     Labs Reviewed   COMP METABOLIC PANEL (14) - Abnormal; Notable for the following components:       Result Value    Glucose 115 (*)     Sodium 134 (*)     Anion Gap <0 (*)     Globulin  3.7 (*)     All other components within normal limits   PROTHROMBIN TIME (PT) - Normal   PTT, ACTIVATED - Normal   URINALYSIS WITH CULTURE REFLEX   CBC WITH DIFFERENTIAL WITH PLATELET            CT ABDOMEN+PELVIS(CONTRAST ONLY)(CPT=74177)    Result Date: 11/19/2024  CONCLUSION:  1. No acute process within the abdomen or pelvis. 2. Low-density nonenhancing cystic focus along the posterior inferior aspect of the body of the pancreas measuring 0.8 cm in maximal dimension.  Accounting for slight differences in technique, this focus was seen on the previous exam and is stable. The appearance is that of a pancreatic cyst, with the possibility of IPMN not excluded.   Follow-up per ACR white paper guidelines recommended.  Also of note there is a focus of fatty infiltration along the tail the pancreas mimicking a nodule, which is previously seen and is stable/unchanged. 3. There is a somewhat lobulated contour to the uterus suspicious for underlying uterine fibroid disease.  Further assessment with ultrasound imaging of the pelvis recommended.  Based on 2017 American College of Radiology white paper guidelines, regarding incidental pancreatic cysts, either surveillance imaging with MRI Q6 months x4 then Q 1 year x2 then Q 2 years x3 or endoscopic ultrasound with FNA could be considered depending on other clinical factors and risk tolerance (these recommendations are based on patient's 79 years of age or younger).  Please note that American college of radiology recommends only 2 MRI follow-ups Q 2 years for patient's in which the cyst is discovered over the age of 80.     LOCATION:  [facility]   Dictated by (CST): Tristen Kennedy DO on 11/19/2024 at 8:48 AM     Finalized by (CST): Tristen Kennedy DO on 11/19/2024 at 9:00 AM             MDM          8:07 patient sitting back and breathing easily in no apparent distress this time.  Patient with no other new complaints at this time.  Will continue to observe at this time.  Patient and patient's daughter updated regarding lab results.  Awaiting CT at this time  9:14 patient sitting back and breathing easily in no apparent distress this time.  Patient's results discussed with the patient and patient's daughter at the bedside.  The patient's daughter states the patient has been following up with her primary care physician regarding the findings of her pancreas.  The patient will be discharged to home and the patient and patient's family knows to follow-up with the primary care physician and return to the ER if any other problems arise.        Medical Decision Making  Patient an IV line established blood work drawn including a CBC, chemistries,  creatinine, and blood sugar all of which are unremarkable.  Liver function tests are found to be unremarkable.  Urinalysis is unremarkable.  Coags are unremarkable.  The patient is sitting back and breathing easily in no apparent distress at this time.  Differential diagnosis considered by myself includes was not limited to acute cystitis, acute shingles, acute diverticulitis.  Patient with no evidence of any rash at this time.  I did discuss with the patient's daughter the need to observe to make sure the patient did not develop any rash in the area.  The patient was given Toradol for pain here in the emergency room with improvement in symptoms after this was given.  The patient had no further new complaints throughout the rest of the emergency room stay.  Patient does have evidence of pancreatic changes which the patient's daughter states they have been following closely and appear to be unchanged compared to previous CT from 2015.  The patient also has uterine fibroids which will need follow-up and the patient's daughter is aware of this.  Patient instructed to return to the ER immediately if symptoms worsen or if any other problems arise.  Patient given prescription for tramadol as needed for pain instructions to take ibuprofen for pain return to the ER immediately if symptoms worsen or if any other problems arise.  Patient discharged to home at this time.    Disposition and Plan     Clinical Impression:  1. Lower abdominal pain    2. Uterine leiomyoma, unspecified location         Disposition:  Discharge  11/19/2024  9:16 am    Follow-up:  No follow-up provider specified.        Medications Prescribed:  Current Discharge Medication List        START taking these medications    Details   traMADol 50 MG Oral Tab Take 1 tablet (50 mg total) by mouth every 8 (eight) hours as needed for Pain.  Qty: 10 tablet, Refills: 0    Associated Diagnoses: Lower abdominal pain                 Supplementary Documentation:

## 2024-12-13 ENCOUNTER — LAB ENCOUNTER (OUTPATIENT)
Dept: LAB | Age: 64
End: 2024-12-13
Attending: INTERNAL MEDICINE
Payer: MEDICARE

## 2024-12-13 DIAGNOSIS — N39.0 UTI (URINARY TRACT INFECTION): ICD-10-CM

## 2024-12-13 DIAGNOSIS — E03.9 HYPOTHYROID: ICD-10-CM

## 2024-12-13 DIAGNOSIS — E78.5 DYSLIPIDEMIA: Primary | ICD-10-CM

## 2024-12-13 DIAGNOSIS — R73.9 HYPERGLYCEMIA: ICD-10-CM

## 2024-12-13 LAB
ALBUMIN SERPL-MCNC: 4.4 G/DL (ref 3.2–4.8)
ALBUMIN/GLOB SERPL: 1.3 {RATIO} (ref 1–2)
ALP LIVER SERPL-CCNC: 57 U/L
ALT SERPL-CCNC: 13 U/L
ANION GAP SERPL CALC-SCNC: 6 MMOL/L (ref 0–18)
AST SERPL-CCNC: 17 U/L (ref ?–34)
BASOPHILS # BLD AUTO: 0.06 X10(3) UL (ref 0–0.2)
BASOPHILS NFR BLD AUTO: 1 %
BILIRUB SERPL-MCNC: 0.4 MG/DL (ref 0.2–1.1)
BILIRUB UR QL STRIP.AUTO: NEGATIVE
BUN BLD-MCNC: 11 MG/DL (ref 9–23)
CALCIUM BLD-MCNC: 9.9 MG/DL (ref 8.7–10.4)
CHLORIDE SERPL-SCNC: 104 MMOL/L (ref 98–112)
CHOLEST SERPL-MCNC: 156 MG/DL (ref ?–200)
CLARITY UR REFRACT.AUTO: CLEAR
CO2 SERPL-SCNC: 32 MMOL/L (ref 21–32)
COLOR UR AUTO: YELLOW
CREAT BLD-MCNC: 0.84 MG/DL
EGFRCR SERPLBLD CKD-EPI 2021: 77 ML/MIN/1.73M2 (ref 60–?)
EOSINOPHIL # BLD AUTO: 0.06 X10(3) UL (ref 0–0.7)
EOSINOPHIL NFR BLD AUTO: 1 %
ERYTHROCYTE [DISTWIDTH] IN BLOOD BY AUTOMATED COUNT: 11.9 %
EST. AVERAGE GLUCOSE BLD GHB EST-MCNC: 137 MG/DL (ref 68–126)
FASTING PATIENT LIPID ANSWER: YES
FASTING STATUS PATIENT QL REPORTED: YES
GLOBULIN PLAS-MCNC: 3.5 G/DL (ref 2–3.5)
GLUCOSE BLD-MCNC: 107 MG/DL (ref 70–99)
GLUCOSE UR STRIP.AUTO-MCNC: NORMAL MG/DL
HBA1C MFR BLD: 6.4 % (ref ?–5.7)
HCT VFR BLD AUTO: 41.1 %
HDLC SERPL-MCNC: 50 MG/DL (ref 40–59)
HGB BLD-MCNC: 13.5 G/DL
IMM GRANULOCYTES # BLD AUTO: 0.01 X10(3) UL (ref 0–1)
IMM GRANULOCYTES NFR BLD: 0.2 %
KETONES UR STRIP.AUTO-MCNC: NEGATIVE MG/DL
LDLC SERPL CALC-MCNC: 92 MG/DL (ref ?–100)
LEUKOCYTE ESTERASE UR QL STRIP.AUTO: NEGATIVE
LYMPHOCYTES # BLD AUTO: 1.79 X10(3) UL (ref 1–4)
LYMPHOCYTES NFR BLD AUTO: 30.2 %
MCH RBC QN AUTO: 30.6 PG (ref 26–34)
MCHC RBC AUTO-ENTMCNC: 32.8 G/DL (ref 31–37)
MCV RBC AUTO: 93.2 FL
MONOCYTES # BLD AUTO: 0.48 X10(3) UL (ref 0.1–1)
MONOCYTES NFR BLD AUTO: 8.1 %
NEUTROPHILS # BLD AUTO: 3.52 X10 (3) UL (ref 1.5–7.7)
NEUTROPHILS # BLD AUTO: 3.52 X10(3) UL (ref 1.5–7.7)
NEUTROPHILS NFR BLD AUTO: 59.5 %
NITRITE UR QL STRIP.AUTO: NEGATIVE
NONHDLC SERPL-MCNC: 106 MG/DL (ref ?–130)
OSMOLALITY SERPL CALC.SUM OF ELEC: 294 MOSM/KG (ref 275–295)
PH UR STRIP.AUTO: 7 [PH] (ref 5–8)
PLATELET # BLD AUTO: 247 10(3)UL (ref 150–450)
POTASSIUM SERPL-SCNC: 4.8 MMOL/L (ref 3.5–5.1)
PROT SERPL-MCNC: 7.9 G/DL (ref 5.7–8.2)
PROT UR STRIP.AUTO-MCNC: NEGATIVE MG/DL
RBC # BLD AUTO: 4.41 X10(6)UL
RBC UR QL AUTO: NEGATIVE
SODIUM SERPL-SCNC: 142 MMOL/L (ref 136–145)
SP GR UR STRIP.AUTO: 1.02 (ref 1–1.03)
TRIGL SERPL-MCNC: 74 MG/DL (ref 30–149)
TSI SER-ACNC: 1.84 UIU/ML (ref 0.55–4.78)
UROBILINOGEN UR STRIP.AUTO-MCNC: NORMAL MG/DL
VLDLC SERPL CALC-MCNC: 12 MG/DL (ref 0–30)
WBC # BLD AUTO: 5.9 X10(3) UL (ref 4–11)

## 2024-12-13 PROCEDURE — 36415 COLL VENOUS BLD VENIPUNCTURE: CPT

## 2024-12-13 PROCEDURE — 80061 LIPID PANEL: CPT

## 2024-12-13 PROCEDURE — 83036 HEMOGLOBIN GLYCOSYLATED A1C: CPT

## 2024-12-13 PROCEDURE — 84443 ASSAY THYROID STIM HORMONE: CPT

## 2024-12-13 PROCEDURE — 85025 COMPLETE CBC W/AUTO DIFF WBC: CPT

## 2024-12-13 PROCEDURE — 81003 URINALYSIS AUTO W/O SCOPE: CPT

## 2024-12-13 PROCEDURE — 80053 COMPREHEN METABOLIC PANEL: CPT

## 2025-01-16 ENCOUNTER — HOSPITAL ENCOUNTER (OUTPATIENT)
Dept: BONE DENSITY | Age: 65
Discharge: HOME | End: 2025-01-16
Attending: INTERNAL MEDICINE
Payer: MEDICARE

## 2025-01-16 DIAGNOSIS — Z13.820 ENCOUNTER FOR OSTEOPOROSIS SCREENING IN ASYMPTOMATIC POSTMENOPAUSAL PATIENT: ICD-10-CM

## 2025-01-16 DIAGNOSIS — Z78.0 ENCOUNTER FOR OSTEOPOROSIS SCREENING IN ASYMPTOMATIC POSTMENOPAUSAL PATIENT: ICD-10-CM

## 2025-01-16 PROCEDURE — 77080 DXA BONE DENSITY AXIAL: CPT | Performed by: INTERNAL MEDICINE

## 2025-01-28 ENCOUNTER — LAB ENCOUNTER (OUTPATIENT)
Dept: LAB | Age: 65
End: 2025-01-28
Attending: INTERNAL MEDICINE
Payer: MEDICARE

## 2025-01-28 DIAGNOSIS — C18.9 COLON CANCER (HCC): Primary | ICD-10-CM

## 2025-01-28 LAB — HEMOCCULT STL QL: NEGATIVE

## 2025-01-28 PROCEDURE — 82274 ASSAY TEST FOR BLOOD FECAL: CPT

## 2025-04-07 ENCOUNTER — LAB ENCOUNTER (OUTPATIENT)
Dept: LAB | Age: 65
End: 2025-04-07
Attending: INTERNAL MEDICINE
Payer: MEDICARE

## 2025-04-07 DIAGNOSIS — M25.50 POLYARTHRALGIA: ICD-10-CM

## 2025-04-07 DIAGNOSIS — R42 DIZZINESS: ICD-10-CM

## 2025-04-07 DIAGNOSIS — E03.9 HYPOTHYROID: ICD-10-CM

## 2025-04-07 DIAGNOSIS — E78.5 DYSLIPIDEMIA: Primary | ICD-10-CM

## 2025-04-07 DIAGNOSIS — E55.9 VITAMIN D DEFICIENCY: ICD-10-CM

## 2025-04-07 LAB
ALBUMIN SERPL-MCNC: 4.4 G/DL (ref 3.2–4.8)
ALBUMIN/GLOB SERPL: 1.4 {RATIO} (ref 1–2)
ALP LIVER SERPL-CCNC: 49 U/L
ALT SERPL-CCNC: 14 U/L
ANION GAP SERPL CALC-SCNC: 7 MMOL/L (ref 0–18)
AST SERPL-CCNC: 21 U/L (ref ?–34)
BILIRUB SERPL-MCNC: 0.7 MG/DL (ref 0.2–1.1)
BILIRUB UR QL STRIP.AUTO: NEGATIVE
BUN BLD-MCNC: 15 MG/DL (ref 9–23)
CALCIUM BLD-MCNC: 9.8 MG/DL (ref 8.7–10.6)
CHLORIDE SERPL-SCNC: 105 MMOL/L (ref 98–112)
CHOLEST SERPL-MCNC: 143 MG/DL (ref ?–200)
CLARITY UR REFRACT.AUTO: CLEAR
CO2 SERPL-SCNC: 30 MMOL/L (ref 21–32)
COLOR UR AUTO: YELLOW
CREAT BLD-MCNC: 0.92 MG/DL
EGFRCR SERPLBLD CKD-EPI 2021: 69 ML/MIN/1.73M2 (ref 60–?)
ERYTHROCYTE [DISTWIDTH] IN BLOOD BY AUTOMATED COUNT: 12.4 %
FASTING PATIENT LIPID ANSWER: NO
FASTING STATUS PATIENT QL REPORTED: NO
GLOBULIN PLAS-MCNC: 3.2 G/DL (ref 2–3.5)
GLUCOSE BLD-MCNC: 106 MG/DL (ref 70–99)
GLUCOSE UR STRIP.AUTO-MCNC: NORMAL MG/DL
HCT VFR BLD AUTO: 39.8 %
HDLC SERPL-MCNC: 56 MG/DL (ref 40–59)
HGB BLD-MCNC: 13.4 G/DL
KETONES UR STRIP.AUTO-MCNC: NEGATIVE MG/DL
LDLC SERPL CALC-MCNC: 73 MG/DL (ref ?–100)
LEUKOCYTE ESTERASE UR QL STRIP.AUTO: NEGATIVE
MCH RBC QN AUTO: 31 PG (ref 26–34)
MCHC RBC AUTO-ENTMCNC: 33.7 G/DL (ref 31–37)
MCV RBC AUTO: 92.1 FL
NITRITE UR QL STRIP.AUTO: NEGATIVE
NONHDLC SERPL-MCNC: 87 MG/DL (ref ?–130)
OSMOLALITY SERPL CALC.SUM OF ELEC: 295 MOSM/KG (ref 275–295)
PH UR STRIP.AUTO: 5.5 [PH] (ref 5–8)
PLATELET # BLD AUTO: 206 10(3)UL (ref 150–450)
POTASSIUM SERPL-SCNC: 4.6 MMOL/L (ref 3.5–5.1)
PROT SERPL-MCNC: 7.6 G/DL (ref 5.7–8.2)
PROT UR STRIP.AUTO-MCNC: NEGATIVE MG/DL
RBC # BLD AUTO: 4.32 X10(6)UL
RBC UR QL AUTO: NEGATIVE
SODIUM SERPL-SCNC: 142 MMOL/L (ref 136–145)
SP GR UR STRIP.AUTO: 1.02 (ref 1–1.03)
TRIGL SERPL-MCNC: 70 MG/DL (ref 30–149)
TSI SER-ACNC: 3.07 UIU/ML (ref 0.55–4.78)
UROBILINOGEN UR STRIP.AUTO-MCNC: NORMAL MG/DL
VIT D+METAB SERPL-MCNC: 57.4 NG/ML (ref 30–100)
VLDLC SERPL CALC-MCNC: 11 MG/DL (ref 0–30)
WBC # BLD AUTO: 5 X10(3) UL (ref 4–11)

## 2025-04-07 PROCEDURE — 80053 COMPREHEN METABOLIC PANEL: CPT

## 2025-04-07 PROCEDURE — 82306 VITAMIN D 25 HYDROXY: CPT

## 2025-04-07 PROCEDURE — 81003 URINALYSIS AUTO W/O SCOPE: CPT

## 2025-04-07 PROCEDURE — 85027 COMPLETE CBC AUTOMATED: CPT

## 2025-04-07 PROCEDURE — 36415 COLL VENOUS BLD VENIPUNCTURE: CPT

## 2025-04-07 PROCEDURE — 80061 LIPID PANEL: CPT

## 2025-04-07 PROCEDURE — 84443 ASSAY THYROID STIM HORMONE: CPT

## (undated) DEVICE — UPPER EXTREMITY CDS-LF: Brand: MEDLINE INDUSTRIES, INC.

## (undated) DEVICE — GLOVE SUR 7.5 SENSICARE PI PIP CRM PWD F

## (undated) DEVICE — 3M™ COBAN™ SELF-ADHERENT WRAP, 1583S, STERILE, 3 IN X 5 YD (7,5 CM X 4,5 M), 24 ROLLS/CASE: Brand: 3M™ COBAN™

## (undated) DEVICE — DISPOSABLE BIPOLAR FORCEPS 4" (10.2CM) JEWELERS, STRAIGHT 0.4MM TIP AND 12 FT. (3.6M) CABLE: Brand: KIRWAN

## (undated) DEVICE — SYRINGE MED 10ML LL TIP W/O SFTY DISP

## (undated) DEVICE — OINTMENT SKIN 3 ANTIBIO BACI ZN NEOMYCN SULF

## (undated) DEVICE — DISPOSABLE TOURNIQUET CUFF SINGLE BLADDER, DUAL PORT AND QUICK CONNECT CONNECTOR: Brand: COLOR CUFF

## (undated) DEVICE — DRESSING PETRO 3X3IN OIL EMUL N ADH GZ KNIT

## (undated) DEVICE — SUT ETHLN 4-0 18IN PS-2 NABSRB BLK 19MM 3/8 C

## (undated) DEVICE — CARPAL TUNNEL KNIVES 5 PACK: Brand: SAFEGUARD MINI CARPAL TUNNEL                                    RELEASE SYSTEM

## (undated) DEVICE — GLOVE SUR 7 SENSICARE PI PIP CRM PWD F

## (undated) DEVICE — DRAPE,TOWEL,LARGE,INVISISHIELD: Brand: MEDLINE

## (undated) DEVICE — SUT MCRYL 3-0 27IN ABSRB UD 19MM PS-2 3/8

## (undated) DEVICE — SOLUTION IRRIG 1000ML 0.9% NACL USP BTL

## (undated) DEVICE — STANDARD HYPODERMIC NEEDLE,POLYPROPYLENE HUB: Brand: MONOJECT

## (undated) DEVICE — GOWN,SIRUS,FABRIC-REINFORCED,X-LARGE: Brand: MEDLINE

## (undated) DEVICE — PADDING CAST 3INX4YD 100% COT ABSRB HIGHLY

## (undated) NOTE — ED AVS SNAPSHOT
Sindymanan Bruce   MRN: [id number]    Department:  THE [facility] Emergency Department in [place]   Date of Visit:  3/7/2019           Disclosure     Insurance plans vary and the physician(s) referred by the ER may not be covered by your plan.  Please conta tell this physician (or your personal doctor if your instructions are to return to your personal doctor) about any new or lasting problems. The primary care or specialist physician will see patients referred from the [facility] Emergency Department.  Woody Prince

## (undated) NOTE — MR AVS SNAPSHOT
[facility]  [address] [place] [address] [phone]               Thank you for choosing us for your health care visit with Truong Raoms MD.  We are glad to serve you and happy to provide you with this summary of your visit Sign up for [employer], your secure online medical record. [employer] will allow you to access patient instructions from your recent visit,  view other health information, and more. To sign up or find more information, go to [email or web address] and cl increments are effective and add up over the week   2 ½ hours per week – spread out over time Use a darlene to keep you motivated   Don’t forget strength training with weights and resistance Set goals and track your progress   You don’t need to join a gym.